# Patient Record
Sex: FEMALE | Race: WHITE | NOT HISPANIC OR LATINO | Employment: FULL TIME | URBAN - METROPOLITAN AREA
[De-identification: names, ages, dates, MRNs, and addresses within clinical notes are randomized per-mention and may not be internally consistent; named-entity substitution may affect disease eponyms.]

---

## 2021-07-12 ENCOUNTER — HISTORICAL (OUTPATIENT)
Dept: ADMINISTRATIVE | Facility: HOSPITAL | Age: 31
End: 2021-07-12

## 2021-07-12 LAB — SARS-COV-2 RNA RESP QL NAA+PROBE: NEGATIVE

## 2021-07-15 ENCOUNTER — HISTORICAL (OUTPATIENT)
Dept: ADMINISTRATIVE | Facility: HOSPITAL | Age: 31
End: 2021-07-15

## 2021-07-15 LAB
FLUAV AG UPPER RESP QL IA.RAPID: NEGATIVE
FLUBV AG UPPER RESP QL IA.RAPID: NEGATIVE
SARS-COV-2 RNA RESP QL NAA+PROBE: NOT DETECTED

## 2021-07-17 LAB — FINAL CULTURE: NORMAL

## 2021-07-18 ENCOUNTER — HISTORICAL (OUTPATIENT)
Dept: ADMINISTRATIVE | Facility: HOSPITAL | Age: 31
End: 2021-07-18

## 2021-07-18 LAB
ABS NEUT (OLG): 3.63 X10(3)/MCL (ref 2.1–9.2)
BASOPHILS NFR BLD MANUAL: 0 %
BILIRUB SERPL-MCNC: NEGATIVE MG/DL
BLOOD URINE, POC: NORMAL
CLARITY, POC UA: CLEAR
COLOR, POC UA: YELLOW
EOSINOPHIL NFR BLD MANUAL: 0 %
ERYTHROCYTE [DISTWIDTH] IN BLOOD BY AUTOMATED COUNT: 13.1 % (ref 11.5–14.5)
GLUCOSE UR QL STRIP: NEGATIVE
GRANULOCYTES NFR BLD MANUAL: 45 % (ref 43–75)
HCT VFR BLD AUTO: 39.1 % (ref 35–46)
HGB BLD-MCNC: 13.1 GM/DL (ref 12–16)
KETONES UR QL STRIP: NEGATIVE
LEUKOCYTE EST, POC UA: NEGATIVE
LYMPHOCYTES NFR BLD MANUAL: 43 % (ref 20.5–51.1)
LYMPHOCYTES NFR BLD MANUAL: 5 %
MCH RBC QN AUTO: 30.5 PG (ref 26–34)
MCHC RBC AUTO-ENTMCNC: 33.5 GM/DL (ref 31–37)
MCV RBC AUTO: 90.9 FL (ref 80–100)
MONOCYTES NFR BLD MANUAL: 7 % (ref 2–9)
NITRITE, POC UA: NEGATIVE
PH, POC UA: 7
PLATELET # BLD AUTO: 131 X10(3)/MCL (ref 130–400)
PLATELET # BLD EST: ADEQUATE 10*3/UL
PMV BLD AUTO: 10.8 FL (ref 7.4–10.4)
PROTEIN, POC: NEGATIVE
RBC # BLD AUTO: 4.3 X10(6)/MCL (ref 4–5.2)
RBC MORPH BLD: NORMAL
SPECIFIC GRAVITY, POC UA: 1.01
UROBILINOGEN, POC UA: NORMAL
WBC # SPEC AUTO: 8.8 X10(3)/MCL (ref 4.5–11)

## 2021-07-20 LAB — FINAL CULTURE: NO GROWTH

## 2021-11-06 ENCOUNTER — HISTORICAL (OUTPATIENT)
Dept: ADMINISTRATIVE | Facility: HOSPITAL | Age: 31
End: 2021-11-06

## 2021-11-06 LAB — SARS-COV-2 RNA RESP QL NAA+PROBE: NEGATIVE

## 2022-04-10 ENCOUNTER — HISTORICAL (OUTPATIENT)
Dept: ADMINISTRATIVE | Facility: HOSPITAL | Age: 32
End: 2022-04-10

## 2022-04-26 VITALS
DIASTOLIC BLOOD PRESSURE: 91 MMHG | OXYGEN SATURATION: 99 % | BODY MASS INDEX: 21.99 KG/M2 | WEIGHT: 124.13 LBS | HEIGHT: 63 IN | SYSTOLIC BLOOD PRESSURE: 132 MMHG

## 2022-05-03 NOTE — HISTORICAL [facility]
This is a historical note converted from [employer]. Formatting and pictures may have been removed.  Please reference [employer] for original formatting and attached multimedia. Chief Complaint  Seen multiple times in [facility] this week, all resp test POC/PCR negative, still c/o fever, also states frequent UTI.  History of Present Illness  Seen multiple times in [facility] this week, all resp test POC/PCR negative, still c/o fever, also states hx frequent UTI. currently taking azithromycin 5 day course, is on day 3. No sore throat. States one of the providers that saw her earlier this week heard wheezing in her lungs. She denies all of the following: headache, congestion, ear pain, throat pain, cough, abd pain, nausea, vomiting, diarrhea, left sided abd fullness/discomfort; dysuria and vaginal discharge.? States for the last week has been sleeping 18+ hours per day. PCP is an hour away.  Review of Systems  Constitutional:?negative except as stated in HPI  Eye:?negative except as stated in HPI  ENMT:?negative except as stated in HPI  Respiratory:?negative except as stated in HPI  Cardiovascular:?negative except as stated in HPI  Gastrointestinal:?negative except as stated in HPI  Genitourinary:?negative except as stated in HPI  Hema/Lymph:?negative except as stated in HPI  Endocrine:?negative except as stated in HPI  Musculoskeletal:?negative except as stated in HPI  Integumentary:?negative except as stated in HPI  Neurologic: negative except as stated in HPI  Physical Exam  Vitals & Measurements  T:?37.9? ?C (Oral)? HR:?95(Peripheral)? RR:?18? BP:?112/76? SpO2:?100%?  HT:?157.00?cm? WT:?56.400?kg? BMI:?22.88? LMP:?07/05/2021 00:00 CDT?  General:?appears wellno signs of respiratory distress?  Eye: PERRLA, EOMI,?clearconjunctiva  HENT:?TMs?clear, flat, light reflexes at 5/7 position, ear canals?normal, posterior pharynx?with?diffuse erythema, cobblestoning, +PND, oropharynx and nasal mucosal surfaces moist, no maxillary/frontal sinus  tenderness to palpation  Neck: full range of motion, no thyromegaly;?1 enlarged?posterior cervical?node on?the right?  Respiratory:?clear to auscultation bilaterally, a/p, no wheezes  Cardiovascular:?regular rate and rhythm without murmurs, gallops or rubs  Gastrointestinal:?soft, non-tender,?non-distended?with normal bowel sounds  Musculoskeletal:?BAL, ambulatory?without assistance or device  Integumentary: no rashes or skin lesions visible  Neurologic: cranial nerves intact, no signs of peripheral neurological deficit, motor/sensory function intact  Assessment/Plan  1.?Fever?R50.9  ?CXR negative; Urine dip negative; Monospot negative, EBV panel pending; COVID negative x2, RSV/FLU AB/Strep negative.  CBC pending.  Complete Azithro course.  If no explanation for fevers comes in labs, defer to PCP for further workup, pt understands.  Ordered:  CBC w/ Auto Diff, Routine collect, 07/18/21 12:08:00 CDT, Blood, Stop date 07/18/21 12:09:00 CDT, Nurse collect, Fever, 07/18/21 12:08:00 CDT  Maryuri-Barr Virus (EBV) Acute Infection Abs Profile-[facility] [id number], Stat collect, 07/18/21 12:08:00 CDT, Blood, Collected, Stop date 07/18/21 12:08:00 CDT, Nurse collect, Pharyngitis  Fever, 07/18/21 12:08:00 CDT  Urine Culture [id number], Routine collect, 07/18/21 13:10:00 CDT, Urine, Clean Catch, Nurse collect, Stop date 07/18/21 13:15:00 CDT, Fever  Frequent UTI  ?   Problem List/Past Medical History  Ongoing  ADD (attention deficit disorder)  Depressed  Historical  ADHD  Depression  Procedure/Surgical History  appendectomy (2001)   Medications  azithromycin 250 mg oral tablet, Oral  D-AMPHETAMINE SALT COMBO 20MG TAB, 20 mg= 1 tab(s), Oral, BID  Paxil 20 mg oral tablet, 20 mg= 1 tab(s), Oral, Daily  Zenchent 0.4 mg-35 mcg oral tablet  Allergies  No Known Allergies  Social History  Abuse/Neglect  No, No, Yes, 07/18/2021  No, 07/15/2021  Alcohol  Current, Beer, Daily, 05/03/2018  Employment/School  Employed,  07/12/2021  Home/Environment  Lives with Alone. Living situation: Home/Independent., 12/21/2018  Nutrition/Health  Regular, 12/21/2018  Substance Use - Denies Substance Abuse, 04/24/2013  Never, 05/03/2018  Tobacco - Denies Tobacco Use, 04/24/2013  Never (less than 100 in lifetime), N/A, 07/18/2021  Family History  Hypertension.: Father.  Stroke: Father.  Health Maintenance  Health Maintenance  ???Pending?(in the next year)  ??? ??OverDue  ??? ? ? ?Influenza Vaccine due??10/01/20??and every 1??day(s)  ??? ??Due?  ??? ? ? ?Cervical Cancer Screening due??07/18/21??Unknown Frequency  ??? ? ? ?Tetanus Vaccine due??07/18/21??and every 10??year(s)  ??? ??Due In Future?  ??? ? ? ?Obesity Screening not due until??01/01/22??and every 1??year(s)  ??? ? ? ?Alcohol Misuse Screening not due until??01/02/22??and every 1??year(s)  ??? ? ? ?ADL Screening not due until??01/27/22??and every 1??year(s)  ???Satisfied?(in the past 1 year)  ??? ??Satisfied?  ??? ? ? ?ADL Screening on??01/27/21.??Satisfied by Polly Prakash LPN  ??? ? ? ?Alcohol Misuse Screening on??07/12/21.??Satisfied by Jim Navarro  ??? ? ? ?Blood Pressure Screening on??07/18/21.??Satisfied by Jim Navarro  ??? ? ? ?Body Mass Index Check on??07/18/21.??Satisfied by Jim Navarro  ??? ? ? ?Depression Screening on??07/18/21.??Satisfied by Jim Navarro  ??? ? ? ?Influenza Vaccine on??01/27/21.??Satisfied by Polly Prakash LPN  ??? ? ? ?Obesity Screening on??07/18/21.??Satisfied by Jim Navarro  ?  Lab Results  Test Name Test Result Date/Time   Urine Color Urine Dipstick Yellow 07/18/2021 12:07 CDT   Urine Appearance Urine Dipstick Clear 07/18/2021 12:07 CDT   pH Urine Dipstick 7 07/18/2021 12:07 CDT   Specific Gravity Urine Dipstick 1.010 07/18/2021 12:07 CDT   Blood Urine Dipstick 1+ Small 07/18/2021 12:07 CDT   Glucose Urine Dipstick Negative 07/18/2021 12:07 CDT   Ketones Urine Dipstick Negative 07/18/2021 12:07 CDT   Protein Urine Dipstick Negative 07/18/2021 12:07  CDT   Bilirubin Urine Dipstick Negative 07/18/2021 12:07 CDT   Urobilinogen Urine Dipstick 0.2 mg/dl 07/18/2021 12:07 CDT   Leukocytes Urine Dipstick Negative 07/18/2021 12:07 CDT   Nitrite Urine Dipstick Negative 07/18/2021 12:07 CDT   Influ A PCR Negative UA 07/15/2021 15:26 CDT   Influ B PCR Negative UA 07/15/2021 15:26 CDT   Resp Sync PCR Negative UA 07/15/2021 15:26 CDT   SARS-CoV-2 PCR Cepheid 07/15/2021 15:26 CDT   Mononucleosis Test POC Negative 07/18/2021 12:07 CDT   Rapid Strep POC Negative 07/15/2021 16:06 CDT   Diagnostic Results  (07/18/2021 13:25 CDT XR Chest 2 Views)  Radiology Report  EXAMINATION  XR Chest 2 Views  ?  INDICATION  Fever x 1 week; wheezing on previous exam  ?  Comparison: 12/15/2011  ?  FINDINGS  The heart is normal in size. There is no focal airspace consolidation.  There is no pleural effusion or definite visible pneumothorax. There  is no acute bony abnormality identified.  ?  IMPRESSION  No acute abnormality of the chest.  ?  Signature Line  Electronically Signed By: Siobhan Islas MD  Date/Time Signed: 07/18/2021 13:26  ? [1]     [1]?XR Chest 2 Views; Siobhan Islas MD 07/18/2021 13:25 CDT

## 2022-12-25 ENCOUNTER — HOSPITAL ENCOUNTER (EMERGENCY)
Facility: HOSPITAL | Age: 32
Discharge: HOME | End: 2022-12-25
Attending: STUDENT IN AN ORGANIZED HEALTH CARE EDUCATION/TRAINING PROGRAM
Payer: MEDICAID

## 2022-12-25 VITALS
BODY MASS INDEX: 23.04 KG/M2 | OXYGEN SATURATION: 100 % | WEIGHT: 130 LBS | HEART RATE: 94 BPM | DIASTOLIC BLOOD PRESSURE: 85 MMHG | TEMPERATURE: 98 F | SYSTOLIC BLOOD PRESSURE: 121 MMHG | RESPIRATION RATE: 17 BRPM | HEIGHT: 63 IN

## 2022-12-25 DIAGNOSIS — S82.851A CLOSED DISPLACED TRIMALLEOLAR FRACTURE OF RIGHT ANKLE, INITIAL ENCOUNTER: Primary | ICD-10-CM

## 2022-12-25 LAB
B-HCG UR QL: NEGATIVE
CTP QC/QA: YES

## 2022-12-25 PROCEDURE — 99285 EMERGENCY DEPT VISIT HI MDM: CPT

## 2022-12-25 PROCEDURE — 63600175 PHARM REV CODE 636 W HCPCS: Performed by: STUDENT IN AN ORGANIZED HEALTH CARE EDUCATION/TRAINING PROGRAM

## 2022-12-25 PROCEDURE — 96361 HYDRATE IV INFUSION ADD-ON: CPT | Mod: 59

## 2022-12-25 PROCEDURE — 96375 TX/PRO/DX INJ NEW DRUG ADDON: CPT

## 2022-12-25 PROCEDURE — 25000003 PHARM REV CODE 250: Performed by: STUDENT IN AN ORGANIZED HEALTH CARE EDUCATION/TRAINING PROGRAM

## 2022-12-25 PROCEDURE — 96374 THER/PROPH/DIAG INJ IV PUSH: CPT

## 2022-12-25 PROCEDURE — 27818 TREATMENT OF ANKLE FRACTURE: CPT | Mod: RT

## 2022-12-25 PROCEDURE — 96376 TX/PRO/DX INJ SAME DRUG ADON: CPT

## 2022-12-25 PROCEDURE — 81025 URINE PREGNANCY TEST: CPT | Performed by: STUDENT IN AN ORGANIZED HEALTH CARE EDUCATION/TRAINING PROGRAM

## 2022-12-25 RX ORDER — KETAMINE HYDROCHLORIDE 50 MG/ML
2 INJECTION, SOLUTION INTRAMUSCULAR; INTRAVENOUS
Status: COMPLETED | OUTPATIENT
Start: 2022-12-25 | End: 2022-12-25

## 2022-12-25 RX ORDER — MORPHINE SULFATE 2 MG/ML
2 INJECTION, SOLUTION INTRAMUSCULAR; INTRAVENOUS
Status: DISCONTINUED | OUTPATIENT
Start: 2022-12-25 | End: 2022-12-25

## 2022-12-25 RX ORDER — MORPHINE SULFATE 2 MG/ML
4 INJECTION, SOLUTION INTRAMUSCULAR; INTRAVENOUS
Status: COMPLETED | OUTPATIENT
Start: 2022-12-25 | End: 2022-12-25

## 2022-12-25 RX ORDER — MORPHINE SULFATE 2 MG/ML
2 INJECTION, SOLUTION INTRAMUSCULAR; INTRAVENOUS
Status: COMPLETED | OUTPATIENT
Start: 2022-12-25 | End: 2022-12-25

## 2022-12-25 RX ORDER — HYDROCODONE BITARTRATE AND ACETAMINOPHEN 5; 325 MG/1; MG/1
1 TABLET ORAL EVERY 6 HOURS PRN
Qty: 14 TABLET | Refills: 0 | Status: SHIPPED | OUTPATIENT
Start: 2022-12-25 | End: 2022-12-30

## 2022-12-25 RX ADMIN — KETAMINE HYDROCHLORIDE 118 MG: 50 INJECTION INTRAMUSCULAR; INTRAVENOUS at 09:12

## 2022-12-25 RX ADMIN — SODIUM CHLORIDE 1000 ML: 9 INJECTION, SOLUTION INTRAVENOUS at 08:12

## 2022-12-25 RX ADMIN — MORPHINE SULFATE 2 MG: 2 INJECTION, SOLUTION INTRAMUSCULAR; INTRAVENOUS at 08:12

## 2022-12-25 RX ADMIN — MORPHINE SULFATE 4 MG: 2 INJECTION, SOLUTION INTRAMUSCULAR; INTRAVENOUS at 11:12

## 2022-12-25 NOTE — Clinical Note
"Rebekah Ohelías Bullard was seen and treated in our emergency department on 12/25/2022.  She may return to work on 12/28/2022.       If you have any questions or concerns, please don't hesitate to call.      Ezra Greene MD"

## 2022-12-25 NOTE — ED PROVIDER NOTES
Encounter Date: 12/25/2022       History     Chief Complaint   Patient presents with    Ankle Pain     PT REPORTS SLIP AND FALL THIS AM. RT ANKLE W GROSS DEFORMITY.  PPP. PT HYPOTENSIVE.  DR SAVAGE MADE AWARE OF PT CONDITION.      32-year-old female presents to ED for right ankle deformity.  States she slipped outside of her house and twisted her ankle.  States the foot has been angulated to the side since.  States otherwise healthy, no medical problems, just started her period so not pregnant. has no allergic reactions to any medications.  Reports intact sensation of the foot itself.  No open wounds.  No problems with the ankle previously.  No other complaints or concerns at this time.    Review of patient's allergies indicates:  No Known Allergies  History reviewed. No pertinent past medical history.  Past Surgical History:   Procedure Laterality Date    APPENDECTOMY  2001     No family history on file.  Social History     Tobacco Use    Smoking status: Never    Smokeless tobacco: Never   Substance Use Topics    Alcohol use: Yes     Comment: occassionally    Drug use: Not Currently     Types: Marijuana     Review of Systems   Constitutional:  Negative for chills, diaphoresis and fever.   HENT:  Negative for congestion, rhinorrhea, sinus pain and sore throat.    Eyes:  Negative for pain, discharge and itching.   Respiratory:  Negative for cough, chest tightness and shortness of breath.    Cardiovascular:  Negative for chest pain and palpitations.   Gastrointestinal:  Negative for abdominal pain, nausea and vomiting.   Genitourinary:  Negative for dysuria, flank pain and hematuria.   Musculoskeletal:  Negative for back pain and myalgias.        Right ankle deformity.   Skin:  Negative for color change and rash.   Neurological:  Negative for dizziness, weakness and headaches.   Psychiatric/Behavioral:  Negative for confusion. The patient is not hyperactive.      Physical Exam     Initial Vitals [12/25/22 0758]   BP  Pulse Resp Temp SpO2   (!) 77/46 78 16 97.9 °F (36.6 °C) 100 %      MAP       --         Physical Exam    Vitals reviewed.  Constitutional: She appears well-developed and well-nourished. She is not diaphoretic. She appears distressed.   HENT:   Head: Normocephalic and atraumatic.   Eyes: Conjunctivae and EOM are normal. Pupils are equal, round, and reactive to light.   Neck: Neck supple. No tracheal deviation present.   Normal range of motion.  Cardiovascular:  Normal rate, regular rhythm, normal heart sounds and intact distal pulses.           Pulmonary/Chest: Breath sounds normal. No respiratory distress.   Abdominal: Abdomen is soft. There is no abdominal tenderness. There is no rebound and no guarding.   Musculoskeletal:      Cervical back: Normal range of motion and neck supple.      Comments: Patient has normal movement of the right hip and knee.  Right ankle has a gross deformity noted with the foot angled 45° to the right.  Intact sensation in all dermatomes of the associated foot.  Foot warm and well perfused.  No open wounds or lacerations.  No other acute findings.     Neurological: She is alert and oriented to person, place, and time. She has normal strength. GCS score is 15. GCS eye subscore is 4. GCS verbal subscore is 5. GCS motor subscore is 6.   Skin: Skin is warm and dry. Capillary refill takes less than 2 seconds. No rash noted.   Psychiatric: She has a normal mood and affect. Her behavior is normal. Judgment and thought content normal.       ED Course   Procedural Sedation        Date/Time: 12/25/2022 5:42 PM  Performed by: Ezra Greene MD  Authorized by: Ezra Greene MD   ASA Class: Class 1 - Heathy patient. No medical history.  Mallampati Score: Class 1 - Visualization of the soft palate, fauces, uvula, and anterior/posterior pillars.     Equipment: on cardiac monitor., on BP monitor., on CO2 monitor., on supplemental oxygen., suction available. and airway equipment available.     Sedation  type: moderate (conscious) sedation    Sedatives: ketamine  Analgesia: morphine  Total Sedation Time (min): 30  Vitals: Vital signs were monitored during sedation.  Complications: No complications.       Critical Care    Date/Time: 12/25/2022 5:49 PM  Performed by: Ezra Greene MD  Authorized by: Ezra Greene MD   Total critical care time (exclusive of procedural time) : 45 minutes  Critical care time was exclusive of separately billable procedures and treating other patients.  Critical care was time spent personally by me on the following activities: evaluation of patient's response to treatment, obtaining history from patient or surrogate, ordering and review of laboratory studies, pulse oximetry, review of old charts, development of treatment plan with patient or surrogate, examination of patient, ordering and performing treatments and interventions, ordering and review of radiographic studies and re-evaluation of patient's condition.  Comments: Reduction, monitoring, complex fracture       Labs Reviewed   POCT URINE PREGNANCY          Imaging Results              X-Ray Ankle Complete Right (Final result)  Result time 12/25/22 10:13:01      Final result by Adelfo Hernandez MD (12/25/22 10:13:01)                   Impression:      As above.      Electronically signed by: Adelfo Hernandez  Date:    12/25/2022  Time:    10:13               Narrative:    EXAMINATION:  XR ANKLE COMPLETE 3 VIEW RIGHT    CLINICAL HISTORY:  Pain, unspecified    TECHNIQUE:  Three views    COMPARISON:  Same date.    FINDINGS:  Post reduction radiographs show much improved displaced fractures of the distal fibula and tibia.  The alignment is anatomic.  There is also no asymmetry or dislocation of the ankle mortise on these images.                                       X-Ray Ankle Complete Right (Final result)  Result time 12/25/22 09:05:39      Final result by Declan Tomas MD (12/25/22 09:05:39)                   Impression:       Fracture dislocation as above      Electronically signed by: Declan Tomas  Date:    12/25/2022  Time:    09:05               Narrative:    EXAMINATION:  XR ANKLE COMPLETE 3 VIEW RIGHT    CLINICAL HISTORY:  Pain, unspecified    COMPARISON:  None.    FINDINGS:  There is a comminuted fracture of the distal fibula with fracture of the medial and likely posterior malleolus with dislocation at the level of the ankle no other definite fractures identified    Joint spaces preserved.    No blastic or lytic lesions.    Soft tissues within normal limits.                                       Medications   ketamine injection 118 mg (118 mg Intravenous Given 12/25/22 0940)   sodium chloride 0.9% bolus 1,000 mL 1,000 mL (0 mLs Intravenous Stopped 12/25/22 1000)   morphine injection 2 mg (2 mg Intravenous Given 12/25/22 0844)   morphine injection 4 mg (4 mg Intravenous Given 12/25/22 1138)     Medical Decision Making:   History:   I obtained history from: EMS provider.  Clinical Tests:   Lab Tests: Reviewed and Ordered  Radiological Study: Reviewed and Ordered  ED Management:  33 yo F presents after fracturing her R ankle after stepping wrong just prior to arrival. In distress. Foot prior to reduction was warm, well perfused with normal sensation. No involvement of the proximal joints or other limbs. Xray showed trimalleolar fx. Significant other bedside. Extensive conversation had with regards to sedation, reduction and expected outcomes, case and ortho f/u. Pt voiced understanding. Ketamine used for sedation, joint reduced successfully and cast with stirrups and posterior slab. Foot after remained warm, well perfused with intact movement. Cap refill <2 seconds. Repeat xray demonstrated significant improvement in alignment.  Waited for sedation to wear off and pain well controlled in department. Referred urgently to ortho, prescribed pain meds and provided strict return precautions. Voiced understanding and d/c stable.  (Jc) extensive time was spent in managing this patients care in isolation.                         Clinical Impression:   Final diagnoses:  [S87.198J] Closed displaced trimalleolar fracture of right ankle, initial encounter (Primary)        ED Disposition Condition    Discharge Stable          ED Prescriptions       Medication Sig Dispense Start Date End Date Auth. Provider    HYDROcodone-acetaminophen (NORCO) 5-325 mg per tablet () Take 1 tablet by mouth every 6 (six) hours as needed for Pain. 14 tablet 2022 Tristan Parks Jr., FNP          Follow-up Information       Follow up With Specialties Details Why Contact Info    [facility] Emergency Medicine  As needed, If symptoms worsen [address] [facility] [address] [phone]    Orthopedics  Schedule an appointment as soon as possible for a visit in 1 week               Ezra Greene MD  [phone]

## 2023-01-04 ENCOUNTER — HOSPITAL ENCOUNTER (OUTPATIENT)
Dept: RADIOLOGY | Facility: HOSPITAL | Age: 33
Discharge: HOME | End: 2023-01-04
Attending: FAMILY MEDICINE
Payer: MEDICAID

## 2023-01-04 ENCOUNTER — HOSPITAL ENCOUNTER (OUTPATIENT)
Dept: RADIOLOGY | Facility: HOSPITAL | Age: 33
Discharge: HOME | End: 2023-01-04
Attending: ORTHOPAEDIC SURGERY
Payer: MEDICAID

## 2023-01-04 ENCOUNTER — ANESTHESIA EVENT (OUTPATIENT)
Dept: SURGERY | Facility: HOSPITAL | Age: 33
End: 2023-01-04
Payer: MEDICAID

## 2023-01-04 ENCOUNTER — OFFICE VISIT (OUTPATIENT)
Dept: ORTHOPEDICS | Facility: CLINIC | Age: 33
End: 2023-01-04
Payer: MEDICAID

## 2023-01-04 VITALS — SYSTOLIC BLOOD PRESSURE: 130 MMHG | DIASTOLIC BLOOD PRESSURE: 93 MMHG | TEMPERATURE: 99 F | HEART RATE: 142 BPM

## 2023-01-04 DIAGNOSIS — S82.851A CLOSED DISPLACED TRIMALLEOLAR FRACTURE OF RIGHT ANKLE, INITIAL ENCOUNTER: ICD-10-CM

## 2023-01-04 DIAGNOSIS — G89.29 CHRONIC PAIN OF RIGHT ANKLE: Primary | ICD-10-CM

## 2023-01-04 DIAGNOSIS — G89.29 CHRONIC PAIN OF RIGHT ANKLE: ICD-10-CM

## 2023-01-04 DIAGNOSIS — M25.571 CHRONIC PAIN OF RIGHT ANKLE: Primary | ICD-10-CM

## 2023-01-04 DIAGNOSIS — M25.571 CHRONIC PAIN OF RIGHT ANKLE: ICD-10-CM

## 2023-01-04 PROCEDURE — 73610 X-RAY EXAM OF ANKLE: CPT | Mod: TC,RT

## 2023-01-04 PROCEDURE — 3075F SYST BP GE 130 - 139MM HG: CPT | Mod: CPTII,,, | Performed by: SPECIALIST

## 2023-01-04 PROCEDURE — 99204 PR OFFICE/OUTPT VISIT, NEW, LEVL IV, 45-59 MIN: ICD-10-PCS | Mod: S$PBB,,, | Performed by: SPECIALIST

## 2023-01-04 PROCEDURE — 3075F PR MOST RECENT SYSTOLIC BLOOD PRESS GE 130-139MM HG: ICD-10-PCS | Mod: CPTII,,, | Performed by: SPECIALIST

## 2023-01-04 PROCEDURE — 99215 OFFICE O/P EST HI 40 MIN: CPT | Mod: PBBFAC

## 2023-01-04 PROCEDURE — 99204 OFFICE O/P NEW MOD 45 MIN: CPT | Mod: S$PBB,,, | Performed by: SPECIALIST

## 2023-01-04 PROCEDURE — 3080F PR MOST RECENT DIASTOLIC BLOOD PRESSURE >= 90 MM HG: ICD-10-PCS | Mod: CPTII,,, | Performed by: SPECIALIST

## 2023-01-04 PROCEDURE — 3080F DIAST BP >= 90 MM HG: CPT | Mod: CPTII,,, | Performed by: SPECIALIST

## 2023-01-04 RX ORDER — NORETHINDRONE AND ETHINYL ESTRADIOL 0.4-0.035
1 KIT ORAL
COMMUNITY
Start: 2022-12-23

## 2023-01-04 RX ORDER — MUPIROCIN 20 MG/G
OINTMENT TOPICAL
Status: CANCELLED | OUTPATIENT
Start: 2023-01-04

## 2023-01-04 RX ORDER — PAROXETINE HYDROCHLORIDE 20 MG/1
20 TABLET, FILM COATED ORAL
COMMUNITY
Start: 2022-12-01 | End: 2023-01-23

## 2023-01-04 RX ORDER — FLUTICASONE PROPIONATE 50 MCG
2 SPRAY, SUSPENSION (ML) NASAL
COMMUNITY
Start: 2022-11-29 | End: 2023-01-23

## 2023-01-04 RX ORDER — DEXTROAMPHETAMINE SACCHARATE, AMPHETAMINE ASPARTATE, DEXTROAMPHETAMINE SULFATE AND AMPHETAMINE SULFATE 5; 5; 5; 5 MG/1; MG/1; MG/1; MG/1
1 TABLET ORAL 2 TIMES DAILY
COMMUNITY
Start: 2022-12-05

## 2023-01-04 NOTE — ANESTHESIA PREPROCEDURE EVALUATION
"                                                                                                             01/04/2023  Rebekah Bullard is a 32 y.o., female with PMHx of depression presents for ORIF Rt ankle.    COVID STATUS: NOT VACCINATED  BETA-BLOCKER: NONE    PAT NURSE PHONE INTERVIEW 1/9/23    PROBLEM LIST:  -  RIGHT TRIMALLEOLAR ANKLE FRACTURE 2/2 SLIP & FALL 12/25/22  -  UPT STATUS  -  DEPRESSION  -  ADHD - on ADDERALL  -  Dx 1/6/23 w/STREP - on AMOXIL X 10 DAYS; 1/9/23 PATIENT REPORTS AFEBRILE & SORE THROAT RESOLVED -THB  -  ETOH "OCASSIONALLY"  -  MARIJUANA USE    AM Rx DOS: FLONASE, PAXIL    ORDERS -   SURGEON: 1/4/23 CBC, CMP, A1c, TSH;  ANESTHESIA: UPT    Pre-op Assessment    I have reviewed the NPO Status.      Review of Systems  Anesthesia Hx:  No problems with previous Anesthesia    Social:  Non-Smoker    Cardiovascular:  Cardiovascular Normal     Pulmonary:  Pulmonary Normal    Renal/:  Renal/ Normal     Hepatic/GI:  Hepatic/GI Normal    Neurological:  Neurology Normal    Endocrine:  Endocrine Normal    Psych:   depression        Vitals:    01/10/23 0704 01/10/23 0713 01/10/23 0957   BP:  110/75 120/72   Pulse:  84 88   Resp:   20   Temp:  37.2 °C (98.9 °F) 36 °C (96.8 °F)   TempSrc:  Oral Temporal   SpO2:  (!) 94% 99%   Weight: 62 kg (136 lb 11 oz)           Physical Exam  General: Alert, Cooperative and Well nourished    Airway:  Mallampati: II   Mouth Opening: Normal  TM Distance: Normal  Tongue: Normal  Neck ROM: Normal ROM    Dental:  Intact    Chest/Lungs:  Normal Respiratory Rate, Clear to auscultation    Heart:  Rate: Normal  Rhythm: Regular Rhythm  Sounds: Normal       Latest Reference Range & Units 01/10/23 07:15   Preg Test, Ur Negative  Negative     Lab Results   Component Value Date    WBC 11.0 01/04/2023    HGB 13.4 01/04/2023    HCT 40.7 01/04/2023    MCV 93.3 01/04/2023     01/04/2023       CMP  Sodium Level   Date Value Ref Range Status   01/04/2023 137 136 - 145 mmol/L " Final     Potassium Level   Date Value Ref Range Status   01/04/2023 4.0 3.5 - 5.1 mmol/L Final     Carbon Dioxide   Date Value Ref Range Status   01/04/2023 25 22 - 29 mmol/L Final     Blood Urea Nitrogen   Date Value Ref Range Status   01/04/2023 14.5 7.0 - 18.7 mg/dL Final     Creatinine   Date Value Ref Range Status   01/04/2023 0.76 0.55 - 1.02 mg/dL Final     Calcium Level Total   Date Value Ref Range Status   01/04/2023 10.0 8.4 - 10.2 mg/dL Final     Albumin Level   Date Value Ref Range Status   01/04/2023 4.0 3.5 - 5.0 g/dL Final     Bilirubin Total   Date Value Ref Range Status   01/04/2023 0.3 <=1.5 mg/dL Final     Alkaline Phosphatase   Date Value Ref Range Status   01/04/2023 82 40 - 150 unit/L Final     Aspartate Aminotransferase   Date Value Ref Range Status   01/04/2023 21 5 - 34 unit/L Final     Alanine Aminotransferase   Date Value Ref Range Status   01/04/2023 17 0 - 55 unit/L Final     eGFR   Date Value Ref Range Status   01/04/2023 >90 mls/min/1.73/m2 Final         Anesthesia Plan  Type of Anesthesia, risks & benefits discussed:    Anesthesia Type: Gen Supraglottic Airway, Regional  Intra-op Monitoring Plan: Standard ASA Monitors  Post Op Pain Control Plan: IV/PO Opioids PRN  Induction:  IV  Airway Plan: Direct  Informed Consent: Informed consent signed with the Patient and all parties understand the risks and agree with anesthesia plan.  All questions answered.   ASA Score: 1  Day of Surgery Review of History & Physical: H&P Update referred to the surgeon/provider.    Ready For Surgery From Anesthesia Perspective.     .

## 2023-01-04 NOTE — PROGRESS NOTES
Orthopedic surgery history and physical    Orthopaedic Injuries:  Right ankle trimalleolar fracture dislocation      S:   This is a pleasant 32-year-old female who sustained a closed right ankle fracture dislocation on Jean Day 12/25/2022 when she had a low energy twisting fall at home.  She went to the emergency department, where she was reduced under sedation and placed into a splint.  Since then, she has been compliant with nonweightbearing on crutches, she has been elevating her foot, she has had persistent ankle pain that has gotten somewhat better since her date of injury she is now week and half out.  She says she is otherwise healthy, she has not have any diabetes, nonsmoker, in good shape.  She works at a local restaurant as a .  She denies any fever, chills, chest pain, shortness of breath, is here to discuss surgery    O:   Vitals:    01/04/23 0928   BP: (!) 130/93   Pulse: (!) 142   Temp: 99.4 °F (37.4 °C)     No results for input(s): WBC, HGB, HCT, PLT in the last 72 hours.  No results for input(s): NA, K, CL, CO2, HCO3C, BUN, LABCREA, GLU in the last 72 hours.  No results for input(s): ESR, CRP in the last 72 hours.    PE:  Gen: A+Ox3, NAD  Card: RRR by RP  Lungs: nonlabored breathing, symmetric chest rise  Abd: S/NT/ND  Right lower extremity   Splint removed   Some resolving ecchymosis over the medial heel pad   Some erythema over her anterior tibiotalar joint line  Tenderness to palpation over the medial and lateral ankle   No fracture blisters   Skin wrinkles to the touch   Sensation intact to light touch over the dorsum and plantar aspect of the foot   Foot is warm and well perfused   EHL/FHL 5/5    Radiology:  Independent review of radiographs shows a trimalleolar right ankle fracture with a well reduced ankle mortise.    A/P:   32-year-old female status post right trimalleolar ankle fracture on 12/25/2022     -we long discussion regarding the indication for open reduction internal  fixation of her right ankle.  We discussed the risk of posttraumatic arthritis, need for further surgery, chronic pain, stiffness, symptomatic hardware, risk of infection and soft tissue breakdown, loss of limb and life, neurovascular injury and consented her for the procedure  -we will get her booked for surgery next Tuesday 12/10/2022   -will get a CT scan urgently for preoperative planning of her right ankle   -preoperative blood work   -placed back into a splint today, strict nonweightbearing right lower extremity, elevate elbow swelling    Dr. Rasheed Turk  [facility]opa[facility]  Pager: [phone]

## 2023-01-04 NOTE — PROGRESS NOTES
Faculty Attestation: Rebekah Bullard  was seen at [facility] in the Orthopaedic Clinic. Discussed with the resident at the time of the visit. History of Present Illness, Physical Exam, and Assessment and Plan reviewed. Treatment plan is reasonable and appropriate. Compliance with treatment recommendations is important. Discussed with the resident at the time of the visit.  No procedure was performed.     Edgar Chairez MD MD  Orthopaedic Surgery

## 2023-01-06 ENCOUNTER — HOSPITAL ENCOUNTER (OUTPATIENT)
Dept: RADIOLOGY | Facility: HOSPITAL | Age: 33
Discharge: HOME | End: 2023-01-06
Attending: ORTHOPAEDIC SURGERY
Payer: MEDICAID

## 2023-01-06 ENCOUNTER — OFFICE VISIT (OUTPATIENT)
Dept: URGENT CARE | Facility: CLINIC | Age: 33
End: 2023-01-06
Attending: ORTHOPAEDIC SURGERY
Payer: MEDICAID

## 2023-01-06 VITALS
WEIGHT: 136.63 LBS | DIASTOLIC BLOOD PRESSURE: 75 MMHG | BODY MASS INDEX: 24.21 KG/M2 | HEART RATE: 103 BPM | HEIGHT: 63 IN | RESPIRATION RATE: 18 BRPM | OXYGEN SATURATION: 98 % | SYSTOLIC BLOOD PRESSURE: 117 MMHG | TEMPERATURE: 98 F

## 2023-01-06 DIAGNOSIS — J02.9 ACUTE PHARYNGITIS, UNSPECIFIED ETIOLOGY: Primary | ICD-10-CM

## 2023-01-06 DIAGNOSIS — J02.9 SORE THROAT: ICD-10-CM

## 2023-01-06 DIAGNOSIS — R68.89 FLU-LIKE SYMPTOMS: ICD-10-CM

## 2023-01-06 DIAGNOSIS — Z11.52 ENCOUNTER FOR SCREENING FOR SEVERE ACUTE RESPIRATORY SYNDROME CORONAVIRUS 2 (SARS-COV-2) INFECTION: ICD-10-CM

## 2023-01-06 DIAGNOSIS — S82.851A CLOSED DISPLACED TRIMALLEOLAR FRACTURE OF RIGHT ANKLE, INITIAL ENCOUNTER: ICD-10-CM

## 2023-01-06 LAB
CTP QC/QA: YES
CTP QC/QA: YES
FLUAV AG NPH QL: NEGATIVE
FLUBV AG NPH QL: NEGATIVE
SARS-COV-2 RDRP RESP QL NAA+PROBE: NEGATIVE

## 2023-01-06 PROCEDURE — 99214 OFFICE O/P EST MOD 30 MIN: CPT | Mod: S$PBB,,, | Performed by: NURSE PRACTITIONER

## 2023-01-06 PROCEDURE — 87081 CULTURE SCREEN ONLY: CPT | Performed by: NURSE PRACTITIONER

## 2023-01-06 PROCEDURE — 99214 PR OFFICE/OUTPT VISIT, EST, LEVL IV, 30-39 MIN: ICD-10-PCS | Mod: S$PBB,,, | Performed by: NURSE PRACTITIONER

## 2023-01-06 PROCEDURE — 99214 OFFICE O/P EST MOD 30 MIN: CPT | Mod: PBBFAC,25 | Performed by: NURSE PRACTITIONER

## 2023-01-06 PROCEDURE — 87635 SARS-COV-2 COVID-19 AMP PRB: CPT | Mod: PBBFAC | Performed by: NURSE PRACTITIONER

## 2023-01-06 PROCEDURE — 73700 CT LOWER EXTREMITY W/O DYE: CPT | Mod: TC,RT

## 2023-01-06 PROCEDURE — 87804 INFLUENZA ASSAY W/OPTIC: CPT | Mod: PBBFAC | Performed by: NURSE PRACTITIONER

## 2023-01-06 RX ORDER — AMOXICILLIN 875 MG/1
875 TABLET, FILM COATED ORAL 2 TIMES DAILY
Qty: 20 TABLET | Refills: 0 | Status: SHIPPED | OUTPATIENT
Start: 2023-01-06 | End: 2023-01-16

## 2023-01-06 NOTE — PROGRESS NOTES
"Subjective:       Patient ID: Rebekah Bullard is a 32 y.o. female.    Vitals:  height is 5' 3" (1.6 m) and weight is 62 kg (136 lb 9.6 oz). Her oral temperature is 98.4 °F (36.9 °C). Her blood pressure is 117/75 and her pulse is 103. Her respiration is 18 and oxygen saturation is 98%.     Chief Complaint: Sore Throat (xYesterday) and Fever (xYesterday)    CC as above. Taking tylenol for symptoms      Constitution: Positive for fever.   HENT:  Positive for sore throat.    Cardiovascular: Negative.    Respiratory: Negative.       Objective:      Physical Exam   Constitutional: She is oriented to person, place, and time. She appears well-developed.   HENT:   Head: Normocephalic.   Ears:   Right Ear: Tympanic membrane normal.   Left Ear: Tympanic membrane normal.   Nose: Nose normal.   Mouth/Throat: Posterior oropharyngeal erythema present.   Eyes: Conjunctivae and EOM are normal. Pupils are equal, round, and reactive to light.   Neck: Neck supple.   Cardiovascular: Normal rate, regular rhythm and normal heart sounds.   Pulmonary/Chest: Effort normal and breath sounds normal.   Musculoskeletal: Normal range of motion.         General: Normal range of motion.   Neurological: She is alert and oriented to person, place, and time.   Skin: Skin is warm and dry.   Psychiatric: Her behavior is normal.   Vitals reviewed.      Assessment:       1. Acute pharyngitis, unspecified etiology    2. Encounter for screening for severe acute respiratory syndrome coronavirus 2 (SARS-CoV-2) infection    3. Flu-like symptoms    4. Sore throat              Office Visit on 01/06/2023   Component Date Value Ref Range Status    POC Rapid COVID 01/06/2023 Negative  Negative Final     Acceptable 01/06/2023 Yes   Final    Rapid Influenza A Ag 01/06/2023 Negative  Negative Final    Rapid Influenza B Ag 01/06/2023 Negative  Negative Final     Acceptable 01/06/2023 Yes   Final        Plan:           Strep PCR pending, " will treat based on clinical symptoms.  Start medication, change toothbrush after 3 days of antibiotic.  Saltwater gargles.  May use acetaminophen alternate with ibuprofen as directed for comfort.  ER precautions.    Acute pharyngitis, unspecified etiology  -     amoxicillin (AMOXIL) 875 MG tablet; Take 1 tablet (875 mg total) by mouth 2 (two) times daily. for 10 days  Dispense: 20 tablet; Refill: 0    Encounter for screening for severe acute respiratory syndrome coronavirus 2 (SARS-CoV-2) infection  -     POCT COVID-19 Rapid Screening    Flu-like symptoms  -     POCT Influenza A/B    Sore throat  -     Strep Only Culture

## 2023-01-07 LAB — BACTERIA THROAT CULT: ABNORMAL

## 2023-01-09 ENCOUNTER — TELEPHONE (OUTPATIENT)
Dept: URGENT CARE | Facility: CLINIC | Age: 33
End: 2023-01-09
Payer: MEDICAID

## 2023-01-09 RX ORDER — CETIRIZINE HYDROCHLORIDE 10 MG/1
10 TABLET ORAL DAILY
COMMUNITY

## 2023-01-09 RX ORDER — OXYCODONE AND ACETAMINOPHEN 5; 325 MG/1; MG/1
1 TABLET ORAL EVERY 6 HOURS PRN
Qty: 28 TABLET | Refills: 0 | Status: SHIPPED | OUTPATIENT
Start: 2023-01-09 | End: 2023-01-16

## 2023-01-09 RX ORDER — IBUPROFEN 800 MG/1
800 TABLET ORAL 3 TIMES DAILY
Qty: 42 TABLET | Refills: 0 | Status: SHIPPED | OUTPATIENT
Start: 2023-01-09 | End: 2023-01-10 | Stop reason: HOSPADM

## 2023-01-09 RX ORDER — ONDANSETRON 4 MG/1
4 TABLET, FILM COATED ORAL 2 TIMES DAILY
Qty: 28 TABLET | Refills: 0 | Status: SHIPPED | OUTPATIENT
Start: 2023-01-09 | End: 2023-01-23

## 2023-01-10 ENCOUNTER — ANESTHESIA (OUTPATIENT)
Dept: SURGERY | Facility: HOSPITAL | Age: 33
End: 2023-01-10
Payer: MEDICAID

## 2023-01-10 ENCOUNTER — HOSPITAL ENCOUNTER (OUTPATIENT)
Facility: HOSPITAL | Age: 33
Discharge: HOME | End: 2023-01-10
Attending: ORTHOPAEDIC SURGERY | Admitting: ORTHOPAEDIC SURGERY
Payer: MEDICAID

## 2023-01-10 VITALS
TEMPERATURE: 98 F | HEART RATE: 81 BPM | WEIGHT: 136.69 LBS | DIASTOLIC BLOOD PRESSURE: 78 MMHG | RESPIRATION RATE: 20 BRPM | OXYGEN SATURATION: 99 % | BODY MASS INDEX: 24.21 KG/M2 | SYSTOLIC BLOOD PRESSURE: 128 MMHG

## 2023-01-10 DIAGNOSIS — S82.851A CLOSED DISPLACED TRIMALLEOLAR FRACTURE OF RIGHT ANKLE, INITIAL ENCOUNTER: ICD-10-CM

## 2023-01-10 DIAGNOSIS — M25.571 CHRONIC PAIN OF RIGHT ANKLE: ICD-10-CM

## 2023-01-10 DIAGNOSIS — G89.29 CHRONIC PAIN OF RIGHT ANKLE: ICD-10-CM

## 2023-01-10 LAB
B-HCG UR QL: NEGATIVE
CTP QC/QA: YES

## 2023-01-10 PROCEDURE — 27201423 OPTIME MED/SURG SUP & DEVICES STERILE SUPPLY: Performed by: ORTHOPAEDIC SURGERY

## 2023-01-10 PROCEDURE — C1769 GUIDE WIRE: HCPCS | Performed by: ORTHOPAEDIC SURGERY

## 2023-01-10 PROCEDURE — 27823 PR OPEN TX TRIMALLEOLAR ANKLE FX W FIX PST LIP: ICD-10-PCS | Mod: RT,,, | Performed by: ORTHOPAEDIC SURGERY

## 2023-01-10 PROCEDURE — 37000008 HC ANESTHESIA 1ST 15 MINUTES: Performed by: ORTHOPAEDIC SURGERY

## 2023-01-10 PROCEDURE — 37000009 HC ANESTHESIA EA ADD 15 MINS: Performed by: ORTHOPAEDIC SURGERY

## 2023-01-10 PROCEDURE — 71000033 HC RECOVERY, INTIAL HOUR: Performed by: ORTHOPAEDIC SURGERY

## 2023-01-10 PROCEDURE — 63600175 PHARM REV CODE 636 W HCPCS

## 2023-01-10 PROCEDURE — 01480 ANES OPEN PX LOWER L/A/F NOS: CPT | Performed by: ORTHOPAEDIC SURGERY

## 2023-01-10 PROCEDURE — 36000709 HC OR TIME LEV III EA ADD 15 MIN: Performed by: ORTHOPAEDIC SURGERY

## 2023-01-10 PROCEDURE — 71000016 HC POSTOP RECOV ADDL HR: Performed by: ORTHOPAEDIC SURGERY

## 2023-01-10 PROCEDURE — 27823 TREATMENT OF ANKLE FRACTURE: CPT | Mod: RT,,, | Performed by: ORTHOPAEDIC SURGERY

## 2023-01-10 PROCEDURE — 63600175 PHARM REV CODE 636 W HCPCS: Performed by: NURSE ANESTHETIST, CERTIFIED REGISTERED

## 2023-01-10 PROCEDURE — C1713 ANCHOR/SCREW BN/BN,TIS/BN: HCPCS | Performed by: ORTHOPAEDIC SURGERY

## 2023-01-10 PROCEDURE — 81025 URINE PREGNANCY TEST: CPT | Performed by: NURSE PRACTITIONER

## 2023-01-10 PROCEDURE — 25000003 PHARM REV CODE 250: Performed by: NURSE ANESTHETIST, CERTIFIED REGISTERED

## 2023-01-10 PROCEDURE — 63600175 PHARM REV CODE 636 W HCPCS: Performed by: ANESTHESIOLOGY

## 2023-01-10 PROCEDURE — 76942 ECHO GUIDE FOR BIOPSY: CPT | Performed by: ANESTHESIOLOGY

## 2023-01-10 PROCEDURE — 71000015 HC POSTOP RECOV 1ST HR: Performed by: ORTHOPAEDIC SURGERY

## 2023-01-10 PROCEDURE — 36000708 HC OR TIME LEV III 1ST 15 MIN: Performed by: ORTHOPAEDIC SURGERY

## 2023-01-10 DEVICE — SCREW VARIAX NON-LOK 2.7X30MM: Type: IMPLANTABLE DEVICE | Site: ANKLE | Status: FUNCTIONAL

## 2023-01-10 DEVICE — SCREW VARIAX NON-LOK 2.7X24MM: Type: IMPLANTABLE DEVICE | Site: ANKLE | Status: FUNCTIONAL

## 2023-01-10 DEVICE — SCREW BONE THREADED T8: Type: IMPLANTABLE DEVICE | Site: ANKLE | Status: FUNCTIONAL

## 2023-01-10 DEVICE — IMPLANTABLE DEVICE: Type: IMPLANTABLE DEVICE | Site: ANKLE | Status: FUNCTIONAL

## 2023-01-10 DEVICE — SCREW BONE NON LOCK 3.5X12MM: Type: IMPLANTABLE DEVICE | Site: ANKLE | Status: FUNCTIONAL

## 2023-01-10 DEVICE — SCREW VARIAX NON LOK 2.4X14MM: Type: IMPLANTABLE DEVICE | Site: ANKLE | Status: FUNCTIONAL

## 2023-01-10 DEVICE — SCREW BONE LOCK T10 3.5X14MM: Type: IMPLANTABLE DEVICE | Site: ANKLE | Status: FUNCTIONAL

## 2023-01-10 DEVICE — SCREW VARIAX NON LOK 2.4X16MM: Type: IMPLANTABLE DEVICE | Site: ANKLE | Status: FUNCTIONAL

## 2023-01-10 DEVICE — SCREW BONE LOCK T8 2.7X22MM: Type: IMPLANTABLE DEVICE | Site: ANKLE | Status: FUNCTIONAL

## 2023-01-10 RX ORDER — ONDANSETRON 2 MG/ML
INJECTION INTRAMUSCULAR; INTRAVENOUS
Status: DISCONTINUED | OUTPATIENT
Start: 2023-01-10 | End: 2023-01-10

## 2023-01-10 RX ORDER — PROPOFOL 10 MG/ML
VIAL (ML) INTRAVENOUS
Status: DISCONTINUED | OUTPATIENT
Start: 2023-01-10 | End: 2023-01-10

## 2023-01-10 RX ORDER — HYDROMORPHONE HYDROCHLORIDE 1 MG/ML
INJECTION, SOLUTION INTRAMUSCULAR; INTRAVENOUS; SUBCUTANEOUS
Status: COMPLETED
Start: 2023-01-10 | End: 2023-01-10

## 2023-01-10 RX ORDER — MORPHINE SULFATE 10 MG/ML
INJECTION INTRAMUSCULAR; INTRAVENOUS; SUBCUTANEOUS
Status: DISCONTINUED
Start: 2023-01-10 | End: 2023-01-10 | Stop reason: HOSPADM

## 2023-01-10 RX ORDER — KETOROLAC TROMETHAMINE 10 MG/1
10 TABLET, FILM COATED ORAL EVERY 8 HOURS
Qty: 15 TABLET | Refills: 0 | Status: SHIPPED | OUTPATIENT
Start: 2023-01-10 | End: 2023-01-15

## 2023-01-10 RX ORDER — KETOROLAC TROMETHAMINE 30 MG/ML
INJECTION, SOLUTION INTRAMUSCULAR; INTRAVENOUS
Status: DISCONTINUED | OUTPATIENT
Start: 2023-01-10 | End: 2023-01-10

## 2023-01-10 RX ORDER — PROMETHAZINE HYDROCHLORIDE 25 MG/ML
INJECTION, SOLUTION INTRAMUSCULAR; INTRAVENOUS
Status: COMPLETED
Start: 2023-01-10 | End: 2023-01-10

## 2023-01-10 RX ORDER — MIDAZOLAM HYDROCHLORIDE 1 MG/ML
INJECTION INTRAMUSCULAR; INTRAVENOUS
Status: DISCONTINUED
Start: 2023-01-10 | End: 2023-01-10 | Stop reason: HOSPADM

## 2023-01-10 RX ORDER — CEFAZOLIN SODIUM 1 G/3ML
INJECTION, POWDER, FOR SOLUTION INTRAMUSCULAR; INTRAVENOUS
Status: DISCONTINUED | OUTPATIENT
Start: 2023-01-10 | End: 2023-01-10

## 2023-01-10 RX ORDER — LIDOCAINE HYDROCHLORIDE 20 MG/ML
INJECTION INTRAVENOUS
Status: DISCONTINUED | OUTPATIENT
Start: 2023-01-10 | End: 2023-01-10

## 2023-01-10 RX ORDER — EPINEPHRINE 1 MG/ML
INJECTION, SOLUTION, CONCENTRATE INTRAVENOUS
Status: DISCONTINUED
Start: 2023-01-10 | End: 2023-01-10 | Stop reason: HOSPADM

## 2023-01-10 RX ORDER — NEOSTIGMINE METHYLSULFATE 1 MG/ML
INJECTION, SOLUTION INTRAVENOUS
Status: DISCONTINUED | OUTPATIENT
Start: 2023-01-10 | End: 2023-01-10

## 2023-01-10 RX ORDER — ONDANSETRON 2 MG/ML
4 INJECTION INTRAMUSCULAR; INTRAVENOUS DAILY PRN
Status: DISCONTINUED | OUTPATIENT
Start: 2023-01-10 | End: 2023-01-10 | Stop reason: HOSPADM

## 2023-01-10 RX ORDER — OXYCODONE HYDROCHLORIDE 5 MG/1
5 TABLET ORAL
Status: DISCONTINUED | OUTPATIENT
Start: 2023-01-10 | End: 2023-01-10 | Stop reason: HOSPADM

## 2023-01-10 RX ORDER — LIDOCAINE HYDROCHLORIDE 10 MG/ML
1 INJECTION, SOLUTION EPIDURAL; INFILTRATION; INTRACAUDAL; PERINEURAL ONCE
Status: ACTIVE | OUTPATIENT
Start: 2023-01-10

## 2023-01-10 RX ORDER — ASPIRIN 81 MG/1
81 TABLET ORAL 2 TIMES DAILY
Qty: 60 TABLET | Refills: 0 | Status: SHIPPED | OUTPATIENT
Start: 2023-01-10 | End: 2023-02-09

## 2023-01-10 RX ORDER — HYDROMORPHONE HYDROCHLORIDE 1 MG/ML
INJECTION, SOLUTION INTRAMUSCULAR; INTRAVENOUS; SUBCUTANEOUS
Status: DISCONTINUED
Start: 2023-01-10 | End: 2023-01-10 | Stop reason: HOSPADM

## 2023-01-10 RX ORDER — MIDAZOLAM HYDROCHLORIDE 1 MG/ML
5 INJECTION INTRAMUSCULAR; INTRAVENOUS ONCE AS NEEDED
Status: COMPLETED | OUTPATIENT
Start: 2023-01-10 | End: 2023-01-10

## 2023-01-10 RX ORDER — MUPIROCIN 20 MG/G
OINTMENT TOPICAL
Status: DISCONTINUED | OUTPATIENT
Start: 2023-01-10 | End: 2023-01-10 | Stop reason: HOSPADM

## 2023-01-10 RX ORDER — SODIUM CHLORIDE 0.9 % (FLUSH) 0.9 %
10 SYRINGE (ML) INJECTION
Status: DISCONTINUED | OUTPATIENT
Start: 2023-01-10 | End: 2023-01-10 | Stop reason: HOSPADM

## 2023-01-10 RX ORDER — MEPERIDINE HYDROCHLORIDE 25 MG/ML
INJECTION INTRAMUSCULAR; INTRAVENOUS; SUBCUTANEOUS
Status: COMPLETED
Start: 2023-01-10 | End: 2023-01-10

## 2023-01-10 RX ORDER — PROMETHAZINE HYDROCHLORIDE 25 MG/ML
12.5 INJECTION, SOLUTION INTRAMUSCULAR; INTRAVENOUS ONCE
Status: COMPLETED | OUTPATIENT
Start: 2023-01-10 | End: 2023-01-10

## 2023-01-10 RX ORDER — SODIUM CHLORIDE, SODIUM LACTATE, POTASSIUM CHLORIDE, CALCIUM CHLORIDE 600; 310; 30; 20 MG/100ML; MG/100ML; MG/100ML; MG/100ML
INJECTION, SOLUTION INTRAVENOUS CONTINUOUS
Status: ACTIVE | OUTPATIENT
Start: 2023-01-10

## 2023-01-10 RX ORDER — MORPHINE SULFATE 2 MG/ML
2 INJECTION, SOLUTION INTRAMUSCULAR; INTRAVENOUS EVERY 5 MIN PRN
Status: DISCONTINUED | OUTPATIENT
Start: 2023-01-10 | End: 2023-01-10 | Stop reason: HOSPADM

## 2023-01-10 RX ORDER — FENTANYL CITRATE 50 UG/ML
INJECTION, SOLUTION INTRAMUSCULAR; INTRAVENOUS
Status: DISCONTINUED | OUTPATIENT
Start: 2023-01-10 | End: 2023-01-10

## 2023-01-10 RX ORDER — HYDROMORPHONE HYDROCHLORIDE 1 MG/ML
0.5 INJECTION, SOLUTION INTRAMUSCULAR; INTRAVENOUS; SUBCUTANEOUS EVERY 5 MIN PRN
Status: COMPLETED | OUTPATIENT
Start: 2023-01-10 | End: 2023-01-10

## 2023-01-10 RX ORDER — MIDAZOLAM HYDROCHLORIDE 5 MG/ML
INJECTION INTRAMUSCULAR; INTRAVENOUS
Status: DISCONTINUED
Start: 2023-01-10 | End: 2023-01-10 | Stop reason: WASHOUT

## 2023-01-10 RX ORDER — DEXAMETHASONE SODIUM PHOSPHATE 4 MG/ML
INJECTION, SOLUTION INTRA-ARTICULAR; INTRALESIONAL; INTRAMUSCULAR; INTRAVENOUS; SOFT TISSUE
Status: DISCONTINUED | OUTPATIENT
Start: 2023-01-10 | End: 2023-01-10

## 2023-01-10 RX ORDER — ROPIVACAINE HYDROCHLORIDE 5 MG/ML
INJECTION, SOLUTION EPIDURAL; INFILTRATION; PERINEURAL
Status: COMPLETED | OUTPATIENT
Start: 2023-01-10 | End: 2023-01-10

## 2023-01-10 RX ORDER — MEPERIDINE HYDROCHLORIDE 25 MG/ML
12.5 INJECTION INTRAMUSCULAR; INTRAVENOUS; SUBCUTANEOUS EVERY 10 MIN PRN
Status: DISCONTINUED | OUTPATIENT
Start: 2023-01-10 | End: 2023-01-10 | Stop reason: HOSPADM

## 2023-01-10 RX ORDER — ROCURONIUM BROMIDE 10 MG/ML
INJECTION, SOLUTION INTRAVENOUS
Status: DISCONTINUED | OUTPATIENT
Start: 2023-01-10 | End: 2023-01-10

## 2023-01-10 RX ORDER — GLYCOPYRROLATE 0.2 MG/ML
INJECTION INTRAMUSCULAR; INTRAVENOUS
Status: DISCONTINUED | OUTPATIENT
Start: 2023-01-10 | End: 2023-01-10

## 2023-01-10 RX ORDER — ROPIVACAINE HYDROCHLORIDE 5 MG/ML
INJECTION, SOLUTION EPIDURAL; INFILTRATION; PERINEURAL
Status: COMPLETED
Start: 2023-01-10 | End: 2023-01-10

## 2023-01-10 RX ORDER — METHOCARBAMOL 500 MG/1
500 TABLET, FILM COATED ORAL 4 TIMES DAILY
Qty: 40 TABLET | Refills: 0 | Status: SHIPPED | OUTPATIENT
Start: 2023-01-10 | End: 2023-01-20

## 2023-01-10 RX ADMIN — DEXAMETHASONE SODIUM PHOSPHATE 8 MG: 4 INJECTION, SOLUTION INTRA-ARTICULAR; INTRALESIONAL; INTRAMUSCULAR; INTRAVENOUS; SOFT TISSUE at 11:01

## 2023-01-10 RX ADMIN — ROPIVACAINE HYDROCHLORIDE 30 ML: 5 INJECTION, SOLUTION EPIDURAL; INFILTRATION; PERINEURAL at 10:01

## 2023-01-10 RX ADMIN — FENTANYL CITRATE 50 MCG: 50 INJECTION, SOLUTION INTRAMUSCULAR; INTRAVENOUS at 10:01

## 2023-01-10 RX ADMIN — LIDOCAINE HYDROCHLORIDE 50 MG: 20 INJECTION INTRAVENOUS at 10:01

## 2023-01-10 RX ADMIN — NEOSTIGMINE METHYLSULFATE 3 MG: 1 INJECTION INTRAVENOUS at 12:01

## 2023-01-10 RX ADMIN — KETOROLAC TROMETHAMINE 30 MG: 30 INJECTION, SOLUTION INTRAMUSCULAR; INTRAVENOUS at 02:01

## 2023-01-10 RX ADMIN — MEPERIDINE HYDROCHLORIDE 12.5 MG: 25 INJECTION INTRAMUSCULAR; INTRAVENOUS; SUBCUTANEOUS at 03:01

## 2023-01-10 RX ADMIN — HYDROMORPHONE HYDROCHLORIDE 0.5 MG: 1 INJECTION, SOLUTION INTRAMUSCULAR; INTRAVENOUS; SUBCUTANEOUS at 03:01

## 2023-01-10 RX ADMIN — SODIUM CHLORIDE, POTASSIUM CHLORIDE, SODIUM LACTATE AND CALCIUM CHLORIDE: 600; 310; 30; 20 INJECTION, SOLUTION INTRAVENOUS at 09:01

## 2023-01-10 RX ADMIN — MORPHINE SULFATE 2 MG: 2 INJECTION, SOLUTION INTRAMUSCULAR; INTRAVENOUS at 03:01

## 2023-01-10 RX ADMIN — ONDANSETRON 4 MG: 2 INJECTION INTRAMUSCULAR; INTRAVENOUS at 11:01

## 2023-01-10 RX ADMIN — PROMETHAZINE HYDROCHLORIDE 12.5 MG: 25 INJECTION INTRAMUSCULAR; INTRAVENOUS at 03:01

## 2023-01-10 RX ADMIN — MIDAZOLAM HYDROCHLORIDE 5 MG: 1 INJECTION, SOLUTION INTRAMUSCULAR; INTRAVENOUS at 10:01

## 2023-01-10 RX ADMIN — GLYCOPYRROLATE 0.4 MG: 0.2 INJECTION INTRAMUSCULAR; INTRAVENOUS at 12:01

## 2023-01-10 RX ADMIN — FENTANYL CITRATE 50 MCG: 50 INJECTION, SOLUTION INTRAMUSCULAR; INTRAVENOUS at 12:01

## 2023-01-10 RX ADMIN — CEFAZOLIN 2 G: 330 INJECTION, POWDER, FOR SOLUTION INTRAMUSCULAR; INTRAVENOUS at 10:01

## 2023-01-10 RX ADMIN — PROMETHAZINE HYDROCHLORIDE 12.5 MG: 25 INJECTION, SOLUTION INTRAMUSCULAR; INTRAVENOUS at 03:01

## 2023-01-10 RX ADMIN — PROPOFOL 150 MG: 10 INJECTION, EMULSION INTRAVENOUS at 10:01

## 2023-01-10 RX ADMIN — ROCURONIUM BROMIDE 50 MG: 10 INJECTION, SOLUTION INTRAVENOUS at 10:01

## 2023-01-10 RX ADMIN — SODIUM CHLORIDE, POTASSIUM CHLORIDE, SODIUM LACTATE AND CALCIUM CHLORIDE: 600; 310; 30; 20 INJECTION, SOLUTION INTRAVENOUS at 02:01

## 2023-01-10 NOTE — TELEPHONE ENCOUNTER
----- Message from CESARIO Alexander sent at 1/7/2023  4:41 PM CST -----  Call with results. She is currently on amoxicillin, cont taking.

## 2023-01-10 NOTE — ANESTHESIA PROCEDURE NOTES
Intubation    Date/Time: 1/10/2023 10:42 AM  Performed by: Brandie Jimenez CRNA  Authorized by: Hannah Quach MD     Intubation:     Induction:  Intravenous    Intubated:  Postinduction    Mask Ventilation:  Easy mask    Attempts:  1    Attempted By:  CRNA    Method of Intubation:  Direct    Blade:  Xavier 2    Laryngeal View Grade: Grade I - full view of cords      Difficult Airway Encountered?: No      Complications:  None    Style/Cuff Inflation:  Cuffed (inflated to minimal occlusive pressure)    Inflation Amount (mL):  6    Tube secured:  20    Secured at:  The lips    Placement Verified By:  Capnometry    Complicating Factors:  None    Findings Post-Intubation:  Atraumatic/condition of teeth unchanged

## 2023-01-10 NOTE — DISCHARGE INSTRUCTIONS
Keep splint on and clean/dry until follow up appointment in clinic.   Patient is to be NON weight-bearing for at least 6-8 weeks.

## 2023-01-10 NOTE — ANESTHESIA PROCEDURE NOTES
Peripheral Block    Patient location during procedure: pre-op   Block not for primary anesthetic.  Reason for block: at surgeon's request and post-op pain management   Post-op Pain Location: Rt ankle pain   Start time: 1/10/2023 10:08 AM  Timeout: 1/10/2023 10:08 AM   End time: 1/10/2023 10:13 AM    Staffing  Authorizing Provider: Hannah Quach MD  Performing Provider: Hannah Quach MD    Preanesthetic Checklist  Completed: patient identified, IV checked, site marked, risks and benefits discussed, surgical consent, monitors and equipment checked, pre-op evaluation and timeout performed  Peripheral Block  Patient position: supine  Prep: ChloraPrep  Patient monitoring: heart rate, cardiac monitor, continuous pulse ox, continuous capnometry and frequent blood pressure checks  Block type: popliteal  Laterality: right  Injection technique: single shot  Needle  Needle type: Stimuplex   Needle gauge: 21 G  Needle length: 4 in  Needle localization: anatomical landmarks and ultrasound guidance   -ultrasound image captured on disc.  Assessment  Injection assessment: negative aspiration, negative parasthesia and local visualized surrounding nerve  Paresthesia pain: none  Heart rate change: no  Slow fractionated injection: yes  Pain Tolerance: comfortable throughout block and no complaints  Medications:    Medications: ropivacaine (NAROPIN) injection 0.5% - Perineural   30 mL - 1/10/2023 10:09:00 AM    Additional Notes  VSS.  [facility] RN monitoring vitals throughout procedure.  Patient tolerated procedure well.

## 2023-01-10 NOTE — H&P
Orthopedic Surgery Interval H&P    Patients history and exam have been reviewed. There are no changes from previous H&P documented below. Plan for OR today with Dr. Lopez for ORIF R Ankle.  Discharge home following surgery    Arturo Patton MD  [facility]          Orthopedic surgery history and physical    Orthopaedic Injuries:  Right ankle trimalleolar fracture dislocation      S:   This is a pleasant 32-year-old female who sustained a closed right ankle fracture dislocation on [place] Day 12/25/2022 when she had a low energy twisting fall at home.  She went to the emergency department, where she was reduced under sedation and placed into a splint.  Since then, she has been compliant with nonweightbearing on crutches, she has been elevating her foot, she has had persistent ankle pain that has gotten somewhat better since her date of injury she is now week and half out.  She says she is otherwise healthy, she has not have any diabetes, nonsmoker, in good shape.  She works at a local restaurant as a .  She denies any fever, chills, chest pain, shortness of breath, is here to discuss surgery      History reviewed. No pertinent past medical history.  Past Surgical History:   Procedure Laterality Date    APPENDECTOMY  2001     Review of patient's allergies indicates:  No Known Allergies      O:   Vitals:    01/10/23 0713   BP: 110/75   Pulse: 84   Temp: 98.9 °F (37.2 °C)     No results for input(s): WBC, HGB, HCT, PLT in the last 72 hours.  No results for input(s): NA, K, CL, CO2, HCO3C, BUN, LABCREA, GLU in the last 72 hours.  No results for input(s): ESR, CRP in the last 72 hours.    PE:  Gen: A+Ox3, NAD  Card: RRR by RP  Lungs: nonlabored breathing, symmetric chest rise  Abd: S/NT/ND  Right lower extremity   Splint removed   Some resolving ecchymosis over the medial heel pad   Some erythema over her anterior tibiotalar joint line  Tenderness to palpation over the medial and lateral ankle   No  fracture blisters   Skin wrinkles to the touch   Sensation intact to light touch over the dorsum and plantar aspect of the foot   Foot is warm and well perfused   EHL/FHL 5/5    Radiology:  Independent review of radiographs shows a trimalleolar right ankle fracture with a well reduced ankle mortise.    A/P:   32-year-old female status post right trimalleolar ankle fracture on 12/25/2022     -we long discussion regarding the indication for open reduction internal fixation of her right ankle.  We discussed the risk of posttraumatic arthritis, need for further surgery, chronic pain, stiffness, symptomatic hardware, risk of infection and soft tissue breakdown, loss of limb and life, neurovascular injury and consented her for the procedure  -we will get her booked for surgery next Tuesday 12/10/2022   -will get a CT scan urgently for preoperative planning of her right ankle   -preoperative blood work   -placed back into a splint today, strict nonweightbearing right lower extremity, elevate elbow swelling    Dr. Rasheed Turk  \[facility]\"" Orthopaedic Surgery  Pager: [phone]

## 2023-01-10 NOTE — DISCHARGE SUMMARY
Orthopedic Surgery Discharge    Procedure: Right ankle ORIF    Pre-op diagnosis: Right trimalleolar ankle fracture    Postop diagnosis: same    Surgeon: DO Arturo Main MD, MD    Blood loss: <50cc    Fluids: see anesthesia documentation    Anesthesia: mac+regional    Complications: none      Patient presented to [facility] on day of scheduled surgery and underwent R ankle ORIF, please see operative report for further detail. Patient did well postoperatively and was found to be fit for discharge on POD# 0.  Their pain was controlled.  The patient met all discharge criteria.  The patient was discharged home in stable condition. Patient was given paper prescriptions.  They were given a follow-up appointment in 2 weeks in clinic for a wound check and range of motion check.  All questions and concerns of the patient were answered to their satisfaction.    Condition: Patient tolerated procedure well, was extubated, and returned to the recovery unit in stable condition.     Post Op Instructions    -maintain dressing clean and dry until followup  -nonweightbearing surgical extremity  -multimodal pain control  -return to clinic in 2 weeks for wound recheck and suture removal    Please call our team with questions or concerns    Dr. Rasheed Turk  [facility] Orthopaedic Surgery  Pager: [phone]

## 2023-01-10 NOTE — ANESTHESIA POSTPROCEDURE EVALUATION
Anesthesia Post Evaluation    Patient: Rebekah Bullard    Procedure(s) Performed: Procedure(s) (LRB):  ORIF, FRACTURE, ANKLE, TRIMALLEOLAR (Right)    Final Anesthesia Type: general      Patient location during evaluation: PACU  Post-procedure vital signs: reviewed and stable  Airway patency: patent      Anesthetic complications: no      Cardiovascular status: hemodynamically stable  Respiratory status: spontaneous ventilation  Follow-up not needed.          Vitals Value Taken Time   /85 01/10/23 1529   Temp 36.4 °C (97.6 °F) 01/10/23 1529   Pulse 79 01/10/23 1529   Resp 16 01/10/23 1535   SpO2 100 % 01/10/23 1529         No case tracking events are documented in the log.      Pain/Abimael Score: Pain Rating Prior to Med Admin: 10 (1/10/2023  3:35 PM)

## 2023-01-10 NOTE — TRANSFER OF CARE
Anesthesia Transfer of Care Note    Patient: Rebekah Bullard    Procedure(s) Performed: Procedure(s) (LRB):  ORIF, FRACTURE, ANKLE, TRIMALLEOLAR (Right)    Patient location: PACU    Anesthesia Type: general    Transport from OR: Transported from OR on room air with adequate spontaneous ventilation    Post pain: adequate analgesia    Post assessment: no apparent anesthetic complications    Post vital signs: stable    Level of consciousness: awake    Nausea/Vomiting: no nausea/vomiting    Complications: none    Transfer of care protocol was followed      Last vitals:   Visit Vitals  /72   Pulse 88   Temp 36 °C (96.8 °F) (Temporal)   Resp 20   Wt 62 kg (136 lb 11 oz)   LMP 12/20/2022   SpO2 99%   Breastfeeding No   BMI 24.21 kg/m²

## 2023-01-10 NOTE — BRIEF OP NOTE
Orthopedic Surgery Brief Op Note/DC Sum     Procedure: Right ankle ORIF     Pre-op diagnosis: Right trimalleolar ankle fracture     Postop diagnosis: same     Surgeon: DO Arturo Main MD, MD     Blood loss: <50cc     Fluids: see anesthesia documentation     Anesthesia: mac+regional     Complications: none        Patient presented to [facility] on day of scheduled surgery and underwent R ankle ORIF, please see operative report for further detail. Patient did well postoperatively and was found to be fit for discharge on POD# 0.  Their pain was controlled.  The patient met all discharge criteria.  The patient was discharged home in stable condition. Patient was given paper prescriptions.  They were given a follow-up appointment in 2 weeks in clinic for a wound check and range of motion check.  All questions and concerns of the patient were answered to their satisfaction.     Condition: Patient tolerated procedure well, was extubated, and returned to the recovery unit in stable condition.      Post Op Instructions     -maintain dressing clean and dry until followup  -nonweightbearing surgical extremity  -multimodal pain control  -return to clinic in 2 weeks for wound recheck and suture removal     Please call our team with questions or concerns     Dr. Rasheed Turk  [facility] Orthopaedic Surgery  Pager: [phone]

## 2023-01-10 NOTE — ANESTHESIA PROCEDURE NOTES
Intubation    Date/Time: 1/10/2023 10:42 AM  Performed by: Brandie Jimenez CRNA  Authorized by: Hannah Quach MD     Intubation:     Induction:  Intravenous    Intubated:  Postinduction    Mask Ventilation:  Easy mask    Attempts:  1    Attempted By:  CRNA    Method of Intubation:  Direct    Blade:  Xavier 2    Laryngeal View Grade: Grade I - full view of cords      Difficult Airway Encountered?: No      Complications:  None    Airway Device:  Oral endotracheal tube    Airway Device Size:  7.0    Style/Cuff Inflation:  Cuffed (inflated to minimal occlusive pressure)    Inflation Amount (mL):  6    Tube secured:  20    Secured at:  The lips    Placement Verified By:  Capnometry    Complicating Factors:  None    Findings Post-Intubation:  BS equal bilateral and atraumatic/condition of teeth unchanged

## 2023-01-23 ENCOUNTER — HOSPITAL ENCOUNTER (OUTPATIENT)
Dept: RADIOLOGY | Facility: HOSPITAL | Age: 33
Discharge: HOME | End: 2023-01-23
Attending: ORTHOPAEDIC SURGERY
Payer: MEDICAID

## 2023-01-23 ENCOUNTER — OFFICE VISIT (OUTPATIENT)
Dept: ORTHOPEDICS | Facility: CLINIC | Age: 33
End: 2023-01-23
Payer: MEDICAID

## 2023-01-23 VITALS
SYSTOLIC BLOOD PRESSURE: 107 MMHG | DIASTOLIC BLOOD PRESSURE: 72 MMHG | WEIGHT: 138 LBS | BODY MASS INDEX: 24.45 KG/M2 | HEIGHT: 63 IN | OXYGEN SATURATION: 97 % | HEART RATE: 87 BPM | TEMPERATURE: 98 F | RESPIRATION RATE: 19 BRPM

## 2023-01-23 DIAGNOSIS — M25.571 CHRONIC PAIN OF RIGHT ANKLE: ICD-10-CM

## 2023-01-23 DIAGNOSIS — G89.29 CHRONIC PAIN OF RIGHT ANKLE: ICD-10-CM

## 2023-01-23 DIAGNOSIS — S82.851A CLOSED DISPLACED TRIMALLEOLAR FRACTURE OF RIGHT ANKLE, INITIAL ENCOUNTER: Primary | ICD-10-CM

## 2023-01-23 DIAGNOSIS — S82.851A CLOSED DISPLACED TRIMALLEOLAR FRACTURE OF RIGHT ANKLE, INITIAL ENCOUNTER: ICD-10-CM

## 2023-01-23 PROCEDURE — 1159F MED LIST DOCD IN RCRD: CPT | Mod: CPTII,,, | Performed by: ORTHOPAEDIC SURGERY

## 2023-01-23 PROCEDURE — 99214 OFFICE O/P EST MOD 30 MIN: CPT | Mod: PBBFAC

## 2023-01-23 PROCEDURE — 99024 PR POST-OP FOLLOW-UP VISIT: ICD-10-PCS | Mod: ,,, | Performed by: ORTHOPAEDIC SURGERY

## 2023-01-23 PROCEDURE — 3078F PR MOST RECENT DIASTOLIC BLOOD PRESSURE < 80 MM HG: ICD-10-PCS | Mod: CPTII,,, | Performed by: ORTHOPAEDIC SURGERY

## 2023-01-23 PROCEDURE — 3078F DIAST BP <80 MM HG: CPT | Mod: CPTII,,, | Performed by: ORTHOPAEDIC SURGERY

## 2023-01-23 PROCEDURE — 3008F BODY MASS INDEX DOCD: CPT | Mod: CPTII,,, | Performed by: ORTHOPAEDIC SURGERY

## 2023-01-23 PROCEDURE — 1159F PR MEDICATION LIST DOCUMENTED IN MEDICAL RECORD: ICD-10-PCS | Mod: CPTII,,, | Performed by: ORTHOPAEDIC SURGERY

## 2023-01-23 PROCEDURE — 73610 X-RAY EXAM OF ANKLE: CPT | Mod: TC,RT

## 2023-01-23 PROCEDURE — 3074F PR MOST RECENT SYSTOLIC BLOOD PRESSURE < 130 MM HG: ICD-10-PCS | Mod: CPTII,,, | Performed by: ORTHOPAEDIC SURGERY

## 2023-01-23 PROCEDURE — 99024 POSTOP FOLLOW-UP VISIT: CPT | Mod: ,,, | Performed by: ORTHOPAEDIC SURGERY

## 2023-01-23 PROCEDURE — 3008F PR BODY MASS INDEX (BMI) DOCUMENTED: ICD-10-PCS | Mod: CPTII,,, | Performed by: ORTHOPAEDIC SURGERY

## 2023-01-23 PROCEDURE — 3074F SYST BP LT 130 MM HG: CPT | Mod: CPTII,,, | Performed by: ORTHOPAEDIC SURGERY

## 2023-01-23 RX ORDER — DEXTROAMPHETAMINE SACCHARATE, AMPHETAMINE ASPARTATE, DEXTROAMPHETAMINE SULFATE AND AMPHETAMINE SULFATE 5; 5; 5; 5 MG/1; MG/1; MG/1; MG/1
TABLET ORAL
COMMUNITY

## 2023-01-23 RX ORDER — PAROXETINE HYDROCHLORIDE 20 MG/1
TABLET, FILM COATED ORAL
COMMUNITY

## 2023-01-23 NOTE — OP NOTE
Orthopedic surgery Operative Note     Date of Service: 1/10/23     Pre-Operative Diagnosis:  Trimalleolar fracture right ankle    Post-Operative Diagnosis: Same     Procedure(s):   1. Open reduction internal fixation right trimalleolar ankle fracture     Anesthesia: General     Surgeon: Cali Lopez MD, was present and scrubbed for the key portions of the procedure.     Assistant(s):   MD Babatunde Fernandez, DO    Indications   Patient is a 33 y.o.female with a closed right trimalleolar ankle fracture. The patient was checked again in the Holding Room. The risks, benefits, complications, treatment options, and expected outcomes were reviewed again with the patient. The patient has elected to proceed with ORIF right ankle. The risks and potential complications include but are not limited to infection, nerve injury, vascular injury, persistent pain, potential skin necrosis, deep vein thrombosis, possible pulmonary embolus, complications of the anesthetics and failure of the implants with potential need for future surgery to remove or revise. The patient concurred with the proposed plan, giving informed consent. The site of surgery was identified by the patient and properly noted/marked by me.     Implant:   1. Jose Carlos mini frag posterior malleolus plate   2. Jose Carlos mini frag medial malleolar plate  3. Ada small frag lateral malleolar plate     Procedure Details:   The patient was taken to Operating Room. A Time-Out was held and the patient, location and procedure was verified and agreed upon by all members of the operating room staff. Prophylacic antibiotics were given.The patient was given general anesthesia.   Following the successful induction of anesthesia the patient was placed prone. The right lower extremity was prepped and draped in normal sterile manner.  A tourniquet was placed and inflated to 250 mmHg.  A posterolateral approach was made taking care to protect the neurovascular  structures.  Peroneal tendons were retracted and moved out of the way, 1st exposing the posterior tibia.  A plate was placed over the posterior malleolar fracture which was held reduced and the plate was secured in an antiglide fashion with C-arm guidance to ensure appropriate screw trajectory.  Next, peroneal tendons were retracted medially and the lateral malleolar fracture was addressed.  Lag screws were placed by technique in order to secure the fracture.  The plate was selected and placed over the lateral malleolus which was reduced in anatomic fashion and then screws were sequentially drilled, again under C-arm guidance guidance to confirm length and trajectory.  The wound was then closed in layers using Monocryl deep followed by nylon for skin closure.  The wound was covered with Ioban and drapes were taken down, the patient was repositioned supine.  The leg was then re-prepped and draped in sterile fashion.  Lastly a direct medial approach was made to the medial malleolus fracture.  Fracture was reduced and a buttress plate was selected and screws were drilled from a medial to lateral fashion through the plate with 1 screw distal at the tip of the medial malleolus for compression.  Tourniquet was then let down, and hemostasis was confirmed.  All screw trajectories and hardware was confirmed to be in appropriate position no hardware penetrating intra-articularly.  The ankle was then stressed under fluoroscopy and the syndesmosis determined to be stable.  The incision overlying the medial malleolus was closed in a similar fashion to the posterior lateral incision.    Sterile dressings were then applied over both incisions, and a well-padded short-leg splint was applied.  The patient was then awakened from anesthesia and transferred to recovery room.    Findings:  Closed, displaced right trimalleolar ankle fracture   Estimated blood loss:  Less than 100 cc   Drains:  None   Total IV fluids: Per anesthesia  records   Specimens:  None   Complications: None, pt tolerated the procedure well   Disposition: Awakened from anesthesia, and taken to the recovery room in a stable condition, having suffered no apparent untoward event   Condition: Stable     Post-Operative Management   Discharge when stable per PACU standpoint   Nonweightbearing right ankle for at least 8 weeks  Remain in splint until follow-up, we will remove splint at follow up, remove sutures and begin gentle ankle range of motion when nonweightbearing, transition to Cam boot  Multimodal pain control  Elevate extremity to minimize swelling   Return to clinic in 2 weeks for re-evaluation      Arturo Patton MD  [facility] Orthopedic Surgery

## 2023-01-23 NOTE — PROGRESS NOTES
[facility] Orthopaedic Surgery H&P Note    In brief, Rebekah Bullard is a 33 y.o. female status post left ankle trimalleolar fracture ORIF on 01/10/2023    HPI:  Patient now 2 weeks out from surgery, here for her 1st postoperative visit.  She is been compliant with nonweightbearing.  She says she has been elevating her leg, she says her pain is improving.  Has some numbness over her great toe, otherwise doing very well.    PMH: History reviewed. No pertinent past medical history.    PSH:   Past Surgical History:   Procedure Laterality Date    APPENDECTOMY  2001    ORIF, FRACTURE, ANKLE, TRIMALLEOLAR Right 1/10/2023    Procedure: ORIF, FRACTURE, ANKLE, TRIMALLEOLAR;  Surgeon: Cali Lopez Jr., MD;  Location: [facility];  Service: Orthopedics;  Laterality: Right;  c arm   kim  bone foam  c armour  tourniquet       SH:   Social History     Socioeconomic History    Marital status: Single   Tobacco Use    Smoking status: Never    Smokeless tobacco: Never   Substance and Sexual Activity    Alcohol use: Yes     Comment: occassionally    Drug use: Not Currently     Types: Marijuana     Comment: occ    Sexual activity: Yes       FH:   Family History   Problem Relation Age of Onset    Hypertension Father        Allergies: Review of patient's allergies indicates:  No Known Allergies    ROS:  Constitutional- no fever, fatigue, weakness  Eye- no vision loss, eye redness, drainage, or pain  ENMT- no sore throat, ear pain, sinus pain/congestion, nasal congestion/drainage  Respiratory- no cough, wheezing, or shortness of breath  CV- no chest pain, no palpitations, no edema  GI- no N/V/D; no abdominal pain    Physical Exam:  Vitals:    01/23/23 1046   BP: 107/72   Pulse: 87   Resp: 19   Temp: 97.9 °F (36.6 °C)       General: NAD  Cardio: RRR by peripheral pulse  Pulm: Normal WOB on room air, symmetric chest rise  Abd: Soft, NT/ND    MSK:    Left lower extremity   Posterolateral and medial incisions healing well, no erythema drainage or  fluctuance, sutures removed today   Ankle dorsiflexion to 30° short of neutral   Sensation intact to light touch over the dorsum and plantar aspect of her foot   Some diffuse numbness over her dorsal great toe   Palpable dorsalis pedis pulse   EHL/FHL 5/5    Imaging:   Independent review of radiographs today show appropriate placement of trimalleolar fixation, no interval displacement.    Assessment:  33-year-old female status post left trimalleolar ankle fracture ORIF on 01/10/2023     -patient doing well at her 1st postoperative visit   -x-rays today, sutures removed   -transitioned to a Cam boot, encourage ankle range of motion   -strict nonweightbearing for 8 weeks postoperatively   -follow up in 1 month repeat left ankle x-rays        Dr. Rasheed Turk  [facility] Orthopaedic Surgery

## 2023-01-23 NOTE — PROGRESS NOTES
Faculty Attestation: Rebekah Bullard  was seen at [facility] in the Orthopaedic Clinic. Discussed with the resident at the time of the visit. History of Present Illness, Physical Exam, and Assessment and Plan reviewed. Treatment plan is reasonable and appropriate. Compliance with treatment recommendations is important. No procedure was performed.     Gabriel Crisostomo MD  Orthopaedic Surgery

## 2023-02-27 ENCOUNTER — OFFICE VISIT (OUTPATIENT)
Dept: ORTHOPEDICS | Facility: CLINIC | Age: 33
End: 2023-02-27
Payer: MEDICAID

## 2023-02-27 ENCOUNTER — HOSPITAL ENCOUNTER (OUTPATIENT)
Dept: RADIOLOGY | Facility: HOSPITAL | Age: 33
Discharge: HOME | End: 2023-02-27
Attending: ORTHOPAEDIC SURGERY
Payer: MEDICAID

## 2023-02-27 VITALS — HEIGHT: 63 IN | BODY MASS INDEX: 24.45 KG/M2 | WEIGHT: 138 LBS

## 2023-02-27 DIAGNOSIS — S82.851A CLOSED DISPLACED TRIMALLEOLAR FRACTURE OF RIGHT ANKLE, INITIAL ENCOUNTER: Primary | ICD-10-CM

## 2023-02-27 DIAGNOSIS — S82.851A CLOSED DISPLACED TRIMALLEOLAR FRACTURE OF RIGHT ANKLE, INITIAL ENCOUNTER: ICD-10-CM

## 2023-02-27 PROCEDURE — 3008F BODY MASS INDEX DOCD: CPT | Mod: CPTII,,, | Performed by: SPECIALIST

## 2023-02-27 PROCEDURE — 3008F PR BODY MASS INDEX (BMI) DOCUMENTED: ICD-10-PCS | Mod: CPTII,,, | Performed by: SPECIALIST

## 2023-02-27 PROCEDURE — 99213 OFFICE O/P EST LOW 20 MIN: CPT | Mod: PBBFAC

## 2023-02-27 PROCEDURE — 1159F PR MEDICATION LIST DOCUMENTED IN MEDICAL RECORD: ICD-10-PCS | Mod: CPTII,,, | Performed by: SPECIALIST

## 2023-02-27 PROCEDURE — 73610 X-RAY EXAM OF ANKLE: CPT | Mod: TC,RT

## 2023-02-27 PROCEDURE — 99024 PR POST-OP FOLLOW-UP VISIT: ICD-10-PCS | Mod: ,,, | Performed by: SPECIALIST

## 2023-02-27 PROCEDURE — 99024 POSTOP FOLLOW-UP VISIT: CPT | Mod: ,,, | Performed by: SPECIALIST

## 2023-02-27 PROCEDURE — 1159F MED LIST DOCD IN RCRD: CPT | Mod: CPTII,,, | Performed by: SPECIALIST

## 2023-02-27 NOTE — PROGRESS NOTES
[facility] Orthopaedic Surgery H&P Note    In brief, Rebekah Bullard is a 33 y.o. female status post left ankle trimalleolar fracture ORIF on 01/10/2023    HPI:  She is now 7 weeks out from surgery.  She is been compliant with nonweightbearing.  She is been doing some passive range-of-motion exercises.  Her wounds have all healed.  He is eager to get back to walking.    PMH: No past medical history on file.    PSH:   Past Surgical History:   Procedure Laterality Date    APPENDECTOMY  2001    ORIF, FRACTURE, ANKLE, TRIMALLEOLAR Right 1/10/2023    Procedure: ORIF, FRACTURE, ANKLE, TRIMALLEOLAR;  Surgeon: Cali Lopez Jr., MD;  Location: [facility];  Service: Orthopedics;  Laterality: Right;  c arm   kim  bone foam  c armour  tourniquet       SH:   Social History     Socioeconomic History    Marital status: Single   Tobacco Use    Smoking status: Never    Smokeless tobacco: Never   Substance and Sexual Activity    Alcohol use: Yes     Comment: occassionally    Drug use: Not Currently     Types: Marijuana     Comment: occ    Sexual activity: Yes       FH:   Family History   Problem Relation Age of Onset    Hypertension Father        Allergies: Review of patient's allergies indicates:  No Known Allergies    ROS:  Constitutional- no fever, fatigue, weakness  Eye- no vision loss, eye redness, drainage, or pain  ENMT- no sore throat, ear pain, sinus pain/congestion, nasal congestion/drainage  Respiratory- no cough, wheezing, or shortness of breath  CV- no chest pain, no palpitations, no edema  GI- no N/V/D; no abdominal pain    Physical Exam:  There were no vitals filed for this visit.      General: NAD  Cardio: RRR by peripheral pulse  Pulm: Normal WOB on room air, symmetric chest rise  Abd: Soft, NT/ND    MSK:    Left lower extremity   Posterolateral and medial incisions healing well, no erythema drainage or fluctuance  Ankle dorsiflexion to 10 ° short of neutral  Sensation intact to light touch over the dorsum and plantar  aspect of her foot   Some diffuse numbness over her dorsal great toe   Palpable dorsalis pedis pulse   EHL/FHL 5/5    Imaging:   Independent review of radiographs today show appropriate placement of trimalleolar fixation, no interval displacement.    Assessment:  33-year-old female status post left trimalleolar ankle fracture ORIF on 01/10/2023     -patient is doing well now 7 weeks out from surgery  -okay to start weight-bearing as tolerated in her Cam boot next week  -we went over several at-home ankle range of motion and strengthening exercises   -I would like to see her back in 1 month for range-of-motion check, if she is doing well PRN.  If she is still stiff she can do some physical therapy at that point        Dr. Rasheed Turk  [facility] Orthopaedic Surgery

## 2023-03-27 ENCOUNTER — HOSPITAL ENCOUNTER (OUTPATIENT)
Dept: RADIOLOGY | Facility: HOSPITAL | Age: 33
Discharge: HOME | End: 2023-03-27
Attending: ORTHOPAEDIC SURGERY
Payer: MEDICAID

## 2023-03-27 ENCOUNTER — OFFICE VISIT (OUTPATIENT)
Dept: ORTHOPEDICS | Facility: CLINIC | Age: 33
End: 2023-03-27
Payer: MEDICAID

## 2023-03-27 VITALS
SYSTOLIC BLOOD PRESSURE: 132 MMHG | WEIGHT: 138 LBS | OXYGEN SATURATION: 100 % | HEIGHT: 63 IN | BODY MASS INDEX: 24.45 KG/M2 | HEART RATE: 78 BPM | RESPIRATION RATE: 18 BRPM | DIASTOLIC BLOOD PRESSURE: 85 MMHG

## 2023-03-27 DIAGNOSIS — S82.851A CLOSED DISPLACED TRIMALLEOLAR FRACTURE OF RIGHT ANKLE, INITIAL ENCOUNTER: Primary | ICD-10-CM

## 2023-03-27 DIAGNOSIS — S82.851A CLOSED DISPLACED TRIMALLEOLAR FRACTURE OF RIGHT ANKLE, INITIAL ENCOUNTER: ICD-10-CM

## 2023-03-27 PROCEDURE — 3079F PR MOST RECENT DIASTOLIC BLOOD PRESSURE 80-89 MM HG: ICD-10-PCS | Mod: CPTII,,, | Performed by: STUDENT IN AN ORGANIZED HEALTH CARE EDUCATION/TRAINING PROGRAM

## 2023-03-27 PROCEDURE — 3075F SYST BP GE 130 - 139MM HG: CPT | Mod: CPTII,,, | Performed by: STUDENT IN AN ORGANIZED HEALTH CARE EDUCATION/TRAINING PROGRAM

## 2023-03-27 PROCEDURE — 99024 PR POST-OP FOLLOW-UP VISIT: ICD-10-PCS | Mod: ,,, | Performed by: STUDENT IN AN ORGANIZED HEALTH CARE EDUCATION/TRAINING PROGRAM

## 2023-03-27 PROCEDURE — 3075F PR MOST RECENT SYSTOLIC BLOOD PRESS GE 130-139MM HG: ICD-10-PCS | Mod: CPTII,,, | Performed by: STUDENT IN AN ORGANIZED HEALTH CARE EDUCATION/TRAINING PROGRAM

## 2023-03-27 PROCEDURE — 3008F BODY MASS INDEX DOCD: CPT | Mod: CPTII,,, | Performed by: STUDENT IN AN ORGANIZED HEALTH CARE EDUCATION/TRAINING PROGRAM

## 2023-03-27 PROCEDURE — 99024 POSTOP FOLLOW-UP VISIT: CPT | Mod: ,,, | Performed by: STUDENT IN AN ORGANIZED HEALTH CARE EDUCATION/TRAINING PROGRAM

## 2023-03-27 PROCEDURE — 3079F DIAST BP 80-89 MM HG: CPT | Mod: CPTII,,, | Performed by: STUDENT IN AN ORGANIZED HEALTH CARE EDUCATION/TRAINING PROGRAM

## 2023-03-27 PROCEDURE — 3008F PR BODY MASS INDEX (BMI) DOCUMENTED: ICD-10-PCS | Mod: CPTII,,, | Performed by: STUDENT IN AN ORGANIZED HEALTH CARE EDUCATION/TRAINING PROGRAM

## 2023-03-27 PROCEDURE — 1159F MED LIST DOCD IN RCRD: CPT | Mod: CPTII,,, | Performed by: STUDENT IN AN ORGANIZED HEALTH CARE EDUCATION/TRAINING PROGRAM

## 2023-03-27 PROCEDURE — 1159F PR MEDICATION LIST DOCUMENTED IN MEDICAL RECORD: ICD-10-PCS | Mod: CPTII,,, | Performed by: STUDENT IN AN ORGANIZED HEALTH CARE EDUCATION/TRAINING PROGRAM

## 2023-03-27 PROCEDURE — 99213 OFFICE O/P EST LOW 20 MIN: CPT | Mod: PBBFAC

## 2023-03-27 PROCEDURE — 73610 X-RAY EXAM OF ANKLE: CPT | Mod: TC,RT

## 2023-03-27 NOTE — PROGRESS NOTES
[facility] Orthopaedic Surgery H&P Note    In brief, Rebekah Bullard is a 33 y.o. female status post left ankle trimalleolar fracture ORIF on 01/10/2023    HPI:  She is now 11 weeks out from surgery.  She no longer has pain.  She is back to walking in regular footwear.  She feels like she still walks with a limp and she is a little stiff.  She works as a  in his eager to get back to work.  No wound issues.      PMH: History reviewed. No pertinent past medical history.    PSH:   Past Surgical History:   Procedure Laterality Date    APPENDECTOMY  2001    ORIF, FRACTURE, ANKLE, TRIMALLEOLAR Right 1/10/2023    Procedure: ORIF, FRACTURE, ANKLE, TRIMALLEOLAR;  Surgeon: Cali Lopez Jr., MD;  Location: [facility];  Service: Orthopedics;  Laterality: Right;  c arm   kim  bone foam  c armour  tourniquet       SH:   Social History     Socioeconomic History    Marital status: Single   Tobacco Use    Smoking status: Never    Smokeless tobacco: Never   Substance and Sexual Activity    Alcohol use: Yes     Comment: occassionally    Drug use: Not Currently     Types: Marijuana     Comment: occ    Sexual activity: Yes       FH:   Family History   Problem Relation Age of Onset    Hypertension Father        Allergies: Review of patient's allergies indicates:  No Known Allergies    ROS:  Constitutional- no fever, fatigue, weakness  Eye- no vision loss, eye redness, drainage, or pain  ENMT- no sore throat, ear pain, sinus pain/congestion, nasal congestion/drainage  Respiratory- no cough, wheezing, or shortness of breath  CV- no chest pain, no palpitations, no edema  GI- no N/V/D; no abdominal pain    Physical Exam:  Vitals:    03/27/23 0803   BP: 132/85   Pulse: 78   Resp: 18         General: NAD  Cardio: RRR by peripheral pulse  Pulm: Normal WOB on room air, symmetric chest rise  Abd: Soft, NT/ND    MSK:    Left lower extremity   Posterolateral and medial incisions fully healed, no erythema drainage or fluctuance  Ankle  dorsiflexion to 10 ° short of neutral  Sensation intact to light touch over the dorsum and plantar aspect of her foot   Some diffuse numbness over her dorsal great toe   Palpable dorsalis pedis pulse   EHL/FHL 5/5    Imaging:   Independent review of radiographs today show appropriate placement of trimalleolar fixation, no interval displacement.    Assessment:  33-year-old female status post left trimalleolar ankle fracture ORIF on 01/10/2023     -okay to weightbear as tolerated and return to work   -she has a mild equinus contracture, we gave her a referral for physical therapy and taught her eccentric stretching exercises that she should do on a daily basis at home   -I would like to see her back in 1 month for final range-of-motion check, okay to PRN at that point        Dr. Rasheed Truk  [facility] Orthopaedic Surgery

## 2023-03-28 NOTE — PROGRESS NOTES
Faculty Attestation: Rebekah Bullard  was seen at [facility] in the Orthopaedic Clinic. Discussed with the resident at the time of the visit. History of Present Illness, Physical Exam, and Assessment and Plan reviewed. Treatment plan is reasonable and appropriate. Compliance with treatment recommendations is important. No procedure was performed.     Romeo Hernandez MD  Orthopaedic Surgery

## 2023-03-29 NOTE — PROGRESS NOTES
Faculty Attestation: Rebekah Bullard  was seen at [facility] in the Orthopaedic Clinic. Discussed with the resident at the time of the visit. History of Present Illness, Physical Exam, and Assessment and Plan reviewed. Treatment plan is reasonable and appropriate. Compliance with treatment recommendations is important. Discussed with the resident at the time of the visit.  No procedure was performed.     Edgar Chairez MD MD  Orthopaedic Surgery    other... other... other... other... other... other... other...

## 2023-05-03 ENCOUNTER — OFFICE VISIT (OUTPATIENT)
Dept: URGENT CARE | Facility: CLINIC | Age: 33
End: 2023-05-03
Payer: MEDICAID

## 2023-05-03 VITALS
RESPIRATION RATE: 16 BRPM | TEMPERATURE: 99 F | OXYGEN SATURATION: 96 % | HEIGHT: 63 IN | WEIGHT: 135 LBS | BODY MASS INDEX: 23.92 KG/M2 | HEART RATE: 87 BPM | SYSTOLIC BLOOD PRESSURE: 128 MMHG | DIASTOLIC BLOOD PRESSURE: 87 MMHG

## 2023-05-03 DIAGNOSIS — U07.1 COVID: Primary | ICD-10-CM

## 2023-05-03 DIAGNOSIS — R09.89 SYMPTOMS OF UPPER RESPIRATORY INFECTION (URI): ICD-10-CM

## 2023-05-03 LAB
CTP QC/QA: YES
SARS-COV-2 RDRP RESP QL NAA+PROBE: POSITIVE

## 2023-05-03 PROCEDURE — 99213 OFFICE O/P EST LOW 20 MIN: CPT | Mod: S$PBB,,,

## 2023-05-03 PROCEDURE — 99214 OFFICE O/P EST MOD 30 MIN: CPT | Mod: PBBFAC

## 2023-05-03 PROCEDURE — 99213 PR OFFICE/OUTPT VISIT, EST, LEVL III, 20-29 MIN: ICD-10-PCS | Mod: S$PBB,,,

## 2023-05-03 PROCEDURE — 87635 SARS-COV-2 COVID-19 AMP PRB: CPT | Mod: PBBFAC

## 2023-05-03 RX ORDER — FLUTICASONE PROPIONATE 50 MCG
2 SPRAY, SUSPENSION (ML) NASAL DAILY
Qty: 18.2 ML | Refills: 0 | Status: SHIPPED | OUTPATIENT
Start: 2023-05-03

## 2023-05-03 RX ORDER — LORATADINE 10 MG/1
10 TABLET ORAL DAILY
Qty: 30 TABLET | Refills: 0 | Status: SHIPPED | OUTPATIENT
Start: 2023-05-03 | End: 2023-06-02

## 2023-05-03 RX ORDER — PROMETHAZINE HYDROCHLORIDE AND DEXTROMETHORPHAN HYDROBROMIDE 6.25; 15 MG/5ML; MG/5ML
5 SYRUP ORAL EVERY 6 HOURS PRN
Qty: 118 ML | Refills: 0 | Status: SHIPPED | OUTPATIENT
Start: 2023-05-03 | End: 2023-05-13

## 2023-05-03 RX ORDER — GUAIFENESIN/DEXTROMETHORPHAN 100-10MG/5
5 SYRUP ORAL EVERY 6 HOURS PRN
Qty: 118 ML | Refills: 0 | Status: SHIPPED | OUTPATIENT
Start: 2023-05-03 | End: 2023-05-13

## 2023-05-03 NOTE — PROGRESS NOTES
"Subjective:      Patient ID: Rebekah Bullard is a 33 y.o. female.    Vitals:  height is 5' 3" (1.6 m) and weight is 61.2 kg (135 lb). Her oral temperature is 99.2 °F (37.3 °C). Her blood pressure is 128/87 and her pulse is 87. Her respiration is 16 and oxygen saturation is 96%.     Chief Complaint: sneezing, itchy throat, Fever, Cough, and Fatigue    PT states sneezing, itchy throat, fever, cough and fatigue since yesterday. Denies known sick contacts.    Fever   Associated symptoms include congestion and coughing.   Cough  Associated symptoms include a fever.   Fatigue  Associated symptoms include congestion, coughing, fatigue and a fever.     Constitution: Positive for fatigue and fever.   HENT:  Positive for congestion.    Neck: neck negative.   Cardiovascular: Negative.    Eyes: Negative.    Respiratory:  Positive for cough.    Gastrointestinal: Negative.    Genitourinary: Negative.    Musculoskeletal: Negative.    Skin: Negative.    Neurological: Negative.     Objective:     Physical Exam   Constitutional: She is oriented to person, place, and time. normal  HENT:   Head: Normocephalic.   Ears:   Right Ear: Tympanic membrane, external ear and ear canal normal.   Left Ear: Tympanic membrane, external ear and ear canal normal.   Nose: Congestion present.   Mouth/Throat: Uvula is midline, oropharynx is clear and moist and mucous membranes are normal. Mucous membranes are moist. Oropharynx is clear.   Eyes: Pupils are equal, round, and reactive to light.   Cardiovascular: Normal rate, regular rhythm, normal heart sounds and normal pulses.   Pulmonary/Chest: Effort normal and breath sounds normal.   Abdominal: Normal appearance. Soft.   Musculoskeletal: Normal range of motion.         General: Normal range of motion.   Neurological: She is alert and oriented to person, place, and time.   Skin: Skin is warm and dry.   Vitals reviewed.  Results for orders placed or performed in visit on 05/03/23   POCT COVID-19 Rapid " Screening   Result Value Ref Range    POC Rapid COVID Positive (A) Negative     Acceptable Yes        Assessment:     1. COVID    2. Symptoms of upper respiratory infection (URI)        Plan:       COVID  -     fluticasone propionate (FLONASE) 50 mcg/actuation nasal spray; 2 sprays (100 mcg total) by Each Nostril route once daily.  Dispense: 18.2 mL; Refill: 0  -     loratadine (CLARITIN) 10 mg tablet; Take 1 tablet (10 mg total) by mouth once daily.  Dispense: 30 tablet; Refill: 0  -     dextromethorphan-guaiFENesin  mg/5 ml (ROBITUSSIN-DM)  mg/5 mL liquid; Take 5 mLs by mouth every 6 (six) hours as needed (cough).  Dispense: 118 mL; Refill: 0  -     promethazine-dextromethorphan (PROMETHAZINE-DM) 6.25-15 mg/5 mL Syrp; Take 5 mLs by mouth every 6 (six) hours as needed (cough).  Dispense: 118 mL; Refill: 0    Symptoms of upper respiratory infection (URI)  -     POCT COVID-19 Rapid Screening      Please drink plenty of fluids.  Please get plenty of rest.    Take over the counter Tylenol (Acetaminophen) and/or Motrin (Ibuprofen) as directed for control of pain and/or fever.  Please follow up with your primary care doctor.     ER precautions given, patient verbalized understanding.     Please see provided patient education for guidance.    Follow up with PCP or return to clinic if symptoms worsen or do not improve.

## 2023-05-08 ENCOUNTER — OFFICE VISIT (OUTPATIENT)
Dept: ORTHOPEDICS | Facility: CLINIC | Age: 33
End: 2023-05-08
Payer: MEDICAID

## 2023-05-08 VITALS — WEIGHT: 138.63 LBS | HEIGHT: 63 IN | BODY MASS INDEX: 24.56 KG/M2

## 2023-05-08 DIAGNOSIS — S82.851A CLOSED DISPLACED TRIMALLEOLAR FRACTURE OF RIGHT ANKLE, INITIAL ENCOUNTER: Primary | ICD-10-CM

## 2023-05-08 PROCEDURE — 99213 PR OFFICE/OUTPT VISIT, EST, LEVL III, 20-29 MIN: ICD-10-PCS | Mod: S$PBB,,, | Performed by: ORTHOPAEDIC SURGERY

## 2023-05-08 PROCEDURE — 1160F RVW MEDS BY RX/DR IN RCRD: CPT | Mod: CPTII,,, | Performed by: ORTHOPAEDIC SURGERY

## 2023-05-08 PROCEDURE — 3008F BODY MASS INDEX DOCD: CPT | Mod: CPTII,,, | Performed by: ORTHOPAEDIC SURGERY

## 2023-05-08 PROCEDURE — 99213 OFFICE O/P EST LOW 20 MIN: CPT | Mod: S$PBB,,, | Performed by: ORTHOPAEDIC SURGERY

## 2023-05-08 PROCEDURE — 1159F PR MEDICATION LIST DOCUMENTED IN MEDICAL RECORD: ICD-10-PCS | Mod: CPTII,,, | Performed by: ORTHOPAEDIC SURGERY

## 2023-05-08 PROCEDURE — 3008F PR BODY MASS INDEX (BMI) DOCUMENTED: ICD-10-PCS | Mod: CPTII,,, | Performed by: ORTHOPAEDIC SURGERY

## 2023-05-08 PROCEDURE — 1160F PR REVIEW ALL MEDS BY PRESCRIBER/CLIN PHARMACIST DOCUMENTED: ICD-10-PCS | Mod: CPTII,,, | Performed by: ORTHOPAEDIC SURGERY

## 2023-05-08 PROCEDURE — 99213 OFFICE O/P EST LOW 20 MIN: CPT | Mod: PBBFAC

## 2023-05-08 PROCEDURE — 1159F MED LIST DOCD IN RCRD: CPT | Mod: CPTII,,, | Performed by: ORTHOPAEDIC SURGERY

## 2023-05-08 NOTE — PROGRESS NOTES
Faculty Attestation: Rebekah Bullard  was seen at [facility] in the Orthopaedic Clinic. Patient chart reviewed. History of Present Illness, Physical Exam, and Assessment and Plan reviewed. Treatment plan is reasonable and appropriate. Compliance with treatment recommendations is important. No procedure was performed.     Cr Thorne MD  Orthopaedic Surgery

## 2023-05-08 NOTE — PROGRESS NOTES
[facility] Orthopaedic Surgery H&P Note    In brief, Rebekah Bullard is a 33 y.o. female status post left ankle trimalleolar fracture ORIF on 01/10/2023    HPI:  Patient doing okay.  No new pain.  He has been working with therapy to get more ankle dorsiflexion.  PMH: History reviewed. No pertinent past medical history.    PSH:   Past Surgical History:   Procedure Laterality Date    APPENDECTOMY  2001    ORIF, FRACTURE, ANKLE, TRIMALLEOLAR Right 1/10/2023    Procedure: ORIF, FRACTURE, ANKLE, TRIMALLEOLAR;  Surgeon: Cali Lopez Jr., MD;  Location: [facility];  Service: Orthopedics;  Laterality: Right;  c arm   kim  bone foam  c armour  tourniquet       SH:   Social History     Socioeconomic History    Marital status: Single   Tobacco Use    Smoking status: Never    Smokeless tobacco: Never   Substance and Sexual Activity    Alcohol use: Yes     Comment: occassionally    Drug use: Not Currently     Types: Marijuana     Comment: occ    Sexual activity: Yes       FH:   Family History   Problem Relation Age of Onset    Hypertension Father        Allergies: Review of patient's allergies indicates:  No Known Allergies    ROS:  Constitutional- no fever, fatigue, weakness  Eye- no vision loss, eye redness, drainage, or pain  ENMT- no sore throat, ear pain, sinus pain/congestion, nasal congestion/drainage  Respiratory- no cough, wheezing, or shortness of breath  CV- no chest pain, no palpitations, no edema  GI- no N/V/D; no abdominal pain    Physical Exam:  There were no vitals filed for this visit.        General: NAD  Cardio: RRR by peripheral pulse  Pulm: Normal WOB on room air, symmetric chest rise  Abd: Soft, NT/ND    MSK:    Left lower extremity   Posterolateral and medial incisions fully healed, no erythema drainage or fluctuance  Ankle dorsiflexion to about 5° short of neutral with the knee extended and can get to neutral dorsiflexion with the knee flexed  Sensation intact to light touch over the dorsum and plantar  aspect of her foot   Some diffuse numbness over her dorsal great toe   Palpable dorsalis pedis pulse   EHL/FHL 5/5    Imaging:   Independent review of radiographs today show appropriate placement of trimalleolar fixation, no interval displacement.    Assessment:  33-year-old female status post left trimalleolar ankle fracture ORIF on 01/10/2023     -okay to weightbear as tolerated and return to work   Stretching at home and therapy for mild equinus contracture  --at this point she is not much pain and not having any issues with her decrease in dorsiflexion compared to contralateral we will let her follow-up as needed and she will continue to work on stretching      Troy Ellsworth

## 2023-11-02 ENCOUNTER — OFFICE VISIT (OUTPATIENT)
Dept: URGENT CARE | Facility: CLINIC | Age: 33
End: 2023-11-02
Payer: MEDICAID

## 2023-11-02 VITALS
DIASTOLIC BLOOD PRESSURE: 90 MMHG | TEMPERATURE: 99 F | OXYGEN SATURATION: 96 % | SYSTOLIC BLOOD PRESSURE: 139 MMHG | BODY MASS INDEX: 25.95 KG/M2 | HEIGHT: 62 IN | HEART RATE: 100 BPM | WEIGHT: 141 LBS | RESPIRATION RATE: 16 BRPM

## 2023-11-02 DIAGNOSIS — S46.811A TRAPEZIUS MUSCLE STRAIN, RIGHT, INITIAL ENCOUNTER: Primary | ICD-10-CM

## 2023-11-02 DIAGNOSIS — R51.9 ACUTE NONINTRACTABLE HEADACHE, UNSPECIFIED HEADACHE TYPE: ICD-10-CM

## 2023-11-02 LAB
CTP QC/QA: YES
SARS-COV-2 RDRP RESP QL NAA+PROBE: NEGATIVE

## 2023-11-02 PROCEDURE — 99215 OFFICE O/P EST HI 40 MIN: CPT | Mod: PBBFAC | Performed by: NURSE PRACTITIONER

## 2023-11-02 PROCEDURE — 87635 SARS-COV-2 COVID-19 AMP PRB: CPT | Mod: PBBFAC | Performed by: NURSE PRACTITIONER

## 2023-11-02 PROCEDURE — 63600175 PHARM REV CODE 636 W HCPCS: Performed by: NURSE PRACTITIONER

## 2023-11-02 PROCEDURE — 99213 OFFICE O/P EST LOW 20 MIN: CPT | Mod: S$PBB,,, | Performed by: NURSE PRACTITIONER

## 2023-11-02 PROCEDURE — 99213 PR OFFICE/OUTPT VISIT, EST, LEVL III, 20-29 MIN: ICD-10-PCS | Mod: S$PBB,,, | Performed by: NURSE PRACTITIONER

## 2023-11-02 RX ORDER — METHOCARBAMOL 500 MG/1
500 TABLET, FILM COATED ORAL 3 TIMES DAILY
Qty: 21 TABLET | Refills: 0 | Status: SHIPPED | OUTPATIENT
Start: 2023-11-02 | End: 2023-11-09

## 2023-11-02 RX ORDER — KETOROLAC TROMETHAMINE 30 MG/ML
60 INJECTION, SOLUTION INTRAMUSCULAR; INTRAVENOUS
Status: COMPLETED | OUTPATIENT
Start: 2023-11-02 | End: 2023-11-02

## 2023-11-02 RX ADMIN — KETOROLAC TROMETHAMINE 60 MG: 30 INJECTION, SOLUTION INTRAMUSCULAR at 04:11

## 2023-11-02 NOTE — PROGRESS NOTES
"Subjective:      Patient ID: Rebekah Bullard is a 33 y.o. female.    Vitals:  height is 5' 2" (1.575 m) and weight is 64 kg (141 lb). Her oral temperature is 98.8 °F (37.1 °C). Her blood pressure is 139/90 (abnormal) and her pulse is 100. Her respiration is 16 and oxygen saturation is 96%.     Chief Complaint: Headache (Patient reports having a headache x 3 days. Reports headache is on the right side, started at the base of head 3 days ago, now the entire right side of head with no light sensitivity.)    Headache      As stated in CC. Pt c/o unilateral headache x3 days, headache began in lower neck and worse with movement of head. Pt has taken motrin with some relief of pain. Pt reports working as a  and does carry large object and lifts over her head. Takes Claritin daily, denies n/v/, sensitivity to light, runny nose, cough, fever, visual disturbances, dizziness.  LMP 10/28/2023. Last eye exam unknown    Neurological:  Positive for headaches.      Objective:     Physical Exam   Constitutional: She is oriented to person, place, and time. She appears well-developed.  Non-toxic appearance. She does not appear ill. No distress.   HENT:   Head: Normocephalic and atraumatic.   Ears:   Right Ear: Tympanic membrane normal. No no drainage, swelling or tenderness. Tympanic membrane is not erythematous, not retracted and not bulging. No middle ear effusion.   Left Ear: Tympanic membrane normal. No no drainage, swelling or tenderness. Tympanic membrane is not erythematous, not retracted and not bulging.  No middle ear effusion.   Nose: Nose normal. No rhinorrhea, purulent discharge or congestion. Right sinus exhibits no maxillary sinus tenderness and no frontal sinus tenderness. Left sinus exhibits no maxillary sinus tenderness and no frontal sinus tenderness.   Mouth/Throat: Uvula is midline and mucous membranes are normal. Mucous membranes are moist. No oropharyngeal exudate, posterior oropharyngeal edema, " posterior oropharyngeal erythema or tonsillar abscesses.   Eyes: Conjunctivae are normal. Pupils are equal, round, and reactive to light. Right eye exhibits no discharge. Left eye exhibits no discharge. Extraocular movement intact   Neck: Neck supple.      Comments: NO JVD, right side TTP of right trapezius angle. No neck rigidity present.   Cardiovascular: Regular rhythm.   Pulmonary/Chest: Effort normal and breath sounds normal. No respiratory distress. She has no wheezes. She has no rales.   Abdominal: Normal appearance. She exhibits no distension. Soft.   Musculoskeletal:      Cervical back: She exhibits tenderness.   Lymphadenopathy:     She has no cervical adenopathy.   Neurological: no focal deficit. She is alert and oriented to person, place, and time. She displays no weakness. No cranial nerve deficit or sensory deficit. Gait normal. Coordination and gait normal. GCS eye subscore is 4. GCS verbal subscore is 5. GCS motor subscore is 6.   Skin: Skin is warm, dry and not diaphoretic. Capillary refill takes less than 2 seconds.   Psychiatric: Her behavior is normal. Mood, judgment and thought content normal.   Nursing note and vitals reviewed.      Assessment:     1. Trapezius muscle strain, right, initial encounter    2. Acute nonintractable headache, unspecified headache type      Results for orders placed or performed in visit on 11/02/23   POCT COVID-19 Rapid Screening   Result Value Ref Range    POC Rapid COVID Negative Negative     Acceptable Yes          Plan:   Tyelnol and Motrin for pain  F/u with PCP  Use heat to help with discomfort  Discussed possible eye strain. Encouraged eye exam to r/o eye strain  Pain appears musculoskeletal related, pain is in head and neck with movement. Neuro exam benign.  Trapezius muscle strain, right, initial encounter  -     methocarbamoL (ROBAXIN) 500 MG Tab; Take 1 tablet (500 mg total) by mouth 3 (three) times daily. for 7 days  Dispense: 21 tablet;  Refill: 0    Acute nonintractable headache, unspecified headache type  -     POCT COVID-19 Rapid Screening    Other orders  -     ketorolac injection 60 mg

## 2023-11-02 NOTE — PATIENT INSTRUCTIONS
Please follow instructions on patient education material.  Return to urgent care in 2 to 3 days if symptoms are not improving, immediately if you develop any new or worsening symptoms  Medications may not resolve your issue.  Please see the included patient education for guidance on exercises and stretches.    Tyelnol and Motrin for pain  F/u with PCP  Please read the attached information about NSAIDs.    Please use the prescriptions that were sent for you.     You got a Toradol shot in clinic today. For the next 8 hours do not take:  Ibuprofen  Naproxen  Advil  Aleve  Motrin  Ketorolac   Diclofenac  Meloxicam   If home routine and medications do not help your pain, please consider seeing a chiropractor, massage therapist or physical therapist even 1 time for manual manipulation.     **Drug [place]: Dr Hester's [place] for soreness, muscle recovery, post-workout. Large bags are $5, pour in tub of warm water and soak for 10-20 minutes.    **Light range of motion of the affected muscles.    **Roll muscle spasm on Lacrosse ball or tennis ball    **Heating pad

## 2023-12-26 ENCOUNTER — OFFICE VISIT (OUTPATIENT)
Dept: URGENT CARE | Facility: CLINIC | Age: 33
End: 2023-12-26
Payer: MEDICAID

## 2023-12-26 VITALS
DIASTOLIC BLOOD PRESSURE: 89 MMHG | TEMPERATURE: 98 F | WEIGHT: 140.5 LBS | HEART RATE: 86 BPM | SYSTOLIC BLOOD PRESSURE: 137 MMHG | HEIGHT: 62 IN | RESPIRATION RATE: 16 BRPM | BODY MASS INDEX: 25.86 KG/M2 | OXYGEN SATURATION: 99 %

## 2023-12-26 DIAGNOSIS — U07.1 COVID-19: Primary | ICD-10-CM

## 2023-12-26 DIAGNOSIS — R68.89 FLU-LIKE SYMPTOMS: ICD-10-CM

## 2023-12-26 LAB
CTP QC/QA: YES
MOLECULAR STREP A: NEGATIVE
POC MOLECULAR INFLUENZA A AGN: NEGATIVE
POC MOLECULAR INFLUENZA B AGN: NEGATIVE
SARS-COV-2 RDRP RESP QL NAA+PROBE: POSITIVE

## 2023-12-26 PROCEDURE — 87502 INFLUENZA DNA AMP PROBE: CPT | Mod: PBBFAC | Performed by: STUDENT IN AN ORGANIZED HEALTH CARE EDUCATION/TRAINING PROGRAM

## 2023-12-26 PROCEDURE — 99213 PR OFFICE/OUTPT VISIT, EST, LEVL III, 20-29 MIN: ICD-10-PCS | Mod: S$PBB,,, | Performed by: STUDENT IN AN ORGANIZED HEALTH CARE EDUCATION/TRAINING PROGRAM

## 2023-12-26 PROCEDURE — 99213 OFFICE O/P EST LOW 20 MIN: CPT | Mod: PBBFAC | Performed by: STUDENT IN AN ORGANIZED HEALTH CARE EDUCATION/TRAINING PROGRAM

## 2023-12-26 PROCEDURE — 87635 SARS-COV-2 COVID-19 AMP PRB: CPT | Mod: PBBFAC | Performed by: STUDENT IN AN ORGANIZED HEALTH CARE EDUCATION/TRAINING PROGRAM

## 2023-12-26 PROCEDURE — 87651 STREP A DNA AMP PROBE: CPT | Mod: PBBFAC | Performed by: STUDENT IN AN ORGANIZED HEALTH CARE EDUCATION/TRAINING PROGRAM

## 2023-12-26 PROCEDURE — 99213 OFFICE O/P EST LOW 20 MIN: CPT | Mod: S$PBB,,, | Performed by: STUDENT IN AN ORGANIZED HEALTH CARE EDUCATION/TRAINING PROGRAM

## 2023-12-26 NOTE — PATIENT INSTRUCTIONS
You tested POSITIVE for Covid-19    You will need to stay home for at least 5 days and isolate from others in your home.You are likely most infectious during these first 5 days (Day 0 of isolation is the day of symptom onset, regardless of when you tested positive)  You may end isolation after day 5 if you are fever-free for 24 hours (without the use of fever-reducing medication), but you will still need to wear your mask through day 10.    General Recommendations:  - Wear a high-quality mask if you must be around others at home and in public.  - Do not go places where you are unable to wear a mask. For travel guidance, see CDCs Travel webpage.  - Do not travel.  - Stay home and separate from others as much as possible.  - Use a separate bathroom, if possible.  - Take steps to improve ventilation at home, if possible.  - Dont share personal household items, like cups, towels, and utensils.  - Monitor your symptoms. If you have an emergency warning sign (like trouble breathing), seek emergency medical care immediately.

## 2023-12-26 NOTE — LETTER
December 26, 2023      [facility]  [facility] [facility] [address]  Phone: [phone]       Patient: Rebekah Bullard   YOB: 1990  Date of Visit: 12/26/2023    To Whom It May Concern:    Mohsen Bullard  was at [facility] on 12/26/2023. The patient may return to work/school on 12/30/2023 with no restrictions. If you have any questions or concerns, or if I can be of further assistance, please do not hesitate to contact me.    Sincerely,      Carmina Elkins MD

## 2023-12-26 NOTE — PROGRESS NOTES
"Subjective:      Patient ID: Rebekah Bullard is a 33 y.o. female.    Vitals:  height is 5' 2.01" (1.575 m) and weight is 63.7 kg (140 lb 8 oz). Her temperature is 98.2 °F (36.8 °C). Her blood pressure is 137/89 and her pulse is 86. Her respiration is 16 and oxygen saturation is 99%.     Chief Complaint: URI ([place], sore throat, nasal congestion, fatigue)    HPI  Rebekah Bullard is a 33-year-old female presenting to the urgent care clinic today with URI symptoms that has been going on since Sunday (2 days ago) patient currently complains of headache, sore throat, chest congestion, intermittent coughing, generalized fatigue.  Patient denies any chest pain or any shortness of breath.  Denies any GI or  symptoms.  ROS   Objective:     Physical Exam   Constitutional: She is oriented to person, place, and time. She appears well-developed. She is cooperative.  Non-toxic appearance. She does not appear ill. No distress.   HENT:   Head: Normocephalic and atraumatic.   Ears:   Right Ear: Hearing, tympanic membrane, external ear and ear canal normal.   Left Ear: Hearing, tympanic membrane, external ear and ear canal normal.   Nose: Nose normal. No mucosal edema, rhinorrhea or nasal deformity. No epistaxis. Right sinus exhibits no maxillary sinus tenderness and no frontal sinus tenderness. Left sinus exhibits no maxillary sinus tenderness and no frontal sinus tenderness.   Mouth/Throat: Uvula is midline, oropharynx is clear and moist and mucous membranes are normal. No trismus in the jaw. Normal dentition. No uvula swelling. No oropharyngeal exudate, posterior oropharyngeal edema or posterior oropharyngeal erythema.   Eyes: Conjunctivae and lids are normal. No scleral icterus.   Neck: Trachea normal and phonation normal. Neck supple. No edema present. No erythema present. No neck rigidity present.   Cardiovascular: Normal rate, regular rhythm, normal heart sounds and normal pulses.   Pulmonary/Chest: Effort normal and " breath sounds normal. No respiratory distress. She has no decreased breath sounds. She has no rhonchi.   Abdominal: Normal appearance.   Musculoskeletal: Normal range of motion.         General: No deformity. Normal range of motion.   Neurological: She is alert and oriented to person, place, and time. She exhibits normal muscle tone. Coordination normal.   Skin: Skin is warm, dry, intact, not diaphoretic and not pale.   Psychiatric: Her speech is normal and behavior is normal. Judgment and thought content normal.   Nursing note and vitals reviewed.      Assessment:     1. COVID-19    2. Flu-like symptoms        Plan:     Patient presents to the urgent care clinic today with URI symptoms for the past 2 days.  Rapid testing in the office shows that she has COVID-19.  We will send in a prescription for symptomatic relief of her symptoms.  Work excuse provided the patient.  Return precautions discussed with patient.    COVID-19    Flu-like symptoms  -     POCT COVID-19 Rapid Screening  -     POCT Influenza A/B MOLECULAR  -     POCT Strep A, Molecular    Other orders  -     phenylephrine-DM-acetaminophen 5- mg Tab; Take 2 each by mouth 3 (three) times daily as needed (cough,congestion,body aches).  Dispense: 30 each; Refill: 0    This note is dictated using the M*Modal Fluency Direct word recognition program. There are word recognition mistakes that are occasionally missed on review.    Carmina Elkins MD

## 2024-12-05 ENCOUNTER — OFFICE VISIT (OUTPATIENT)
Dept: URGENT CARE | Facility: CLINIC | Age: 34
End: 2024-12-05
Payer: MEDICAID

## 2024-12-05 VITALS
RESPIRATION RATE: 16 BRPM | HEART RATE: 87 BPM | TEMPERATURE: 98 F | BODY MASS INDEX: 24.36 KG/M2 | SYSTOLIC BLOOD PRESSURE: 136 MMHG | WEIGHT: 137.5 LBS | HEIGHT: 63 IN | OXYGEN SATURATION: 100 % | DIASTOLIC BLOOD PRESSURE: 86 MMHG

## 2024-12-05 DIAGNOSIS — N39.0 ACUTE UTI: Primary | ICD-10-CM

## 2024-12-05 DIAGNOSIS — R31.9 HEMATURIA, UNSPECIFIED TYPE: ICD-10-CM

## 2024-12-05 LAB
B-HCG UR QL: NEGATIVE
BILIRUB UR QL STRIP: NEGATIVE
CTP QC/QA: YES
GLUCOSE UR QL STRIP: NEGATIVE
KETONES UR QL STRIP: NEGATIVE
LEUKOCYTE ESTERASE UR QL STRIP: POSITIVE
PH, POC UA: 7
POC BLOOD, URINE: POSITIVE
POC NITRATES, URINE: NEGATIVE
PROT UR QL STRIP: NEGATIVE
SP GR UR STRIP: 1.01 (ref 1–1.03)
UROBILINOGEN UR STRIP-ACNC: ABNORMAL (ref 0.1–1.1)

## 2024-12-05 PROCEDURE — 99213 OFFICE O/P EST LOW 20 MIN: CPT | Mod: S$PBB,,, | Performed by: NURSE PRACTITIONER

## 2024-12-05 PROCEDURE — 99214 OFFICE O/P EST MOD 30 MIN: CPT | Mod: PBBFAC | Performed by: NURSE PRACTITIONER

## 2024-12-05 PROCEDURE — 87086 URINE CULTURE/COLONY COUNT: CPT | Performed by: NURSE PRACTITIONER

## 2024-12-05 PROCEDURE — 81003 URINALYSIS AUTO W/O SCOPE: CPT | Mod: PBBFAC | Performed by: NURSE PRACTITIONER

## 2024-12-05 PROCEDURE — 81025 URINE PREGNANCY TEST: CPT | Mod: PBBFAC | Performed by: NURSE PRACTITIONER

## 2024-12-05 RX ORDER — AZELASTINE 1 MG/ML
SPRAY, METERED NASAL
COMMUNITY

## 2024-12-05 RX ORDER — NITROFURANTOIN 25; 75 MG/1; MG/1
100 CAPSULE ORAL 2 TIMES DAILY
Qty: 14 CAPSULE | Refills: 0 | Status: SHIPPED | OUTPATIENT
Start: 2024-12-05 | End: 2024-12-12

## 2024-12-05 RX ORDER — ATORVASTATIN CALCIUM 20 MG/1
1 TABLET, FILM COATED ORAL NIGHTLY
COMMUNITY

## 2024-12-05 NOTE — PATIENT INSTRUCTIONS
Medication as prescribed.  Urine sent for C&S.  Maintain adequate hydration.  If any flank pain, fever or any symptoms immediately go to the ER.

## 2024-12-05 NOTE — PROGRESS NOTES
"Subjective:       Patient ID: Rebekah Bullard is a 34 y.o. female.    Vitals:  height is 5' 3" (1.6 m) and weight is 62.4 kg (137 lb 8 oz). Her temperature is 98.1 °F (36.7 °C). Her blood pressure is 136/86 and her pulse is 87. Her respiration is 16 and oxygen saturation is 100%.     Chief Complaint: Hematuria (Hematuria since last night. States h/o UTI)    HPI    Constitution: Negative.   Cardiovascular: Negative.    Gastrointestinal: Negative.    Neurological: Negative.        Objective:      Physical Exam   Constitutional: She is oriented to person, place, and time. She appears well-developed.   HENT:   Head: Normocephalic.   Eyes: EOM are normal.   Abdominal: Soft. There is no abdominal tenderness. There is no rebound, no guarding, no left CVA tenderness and no right CVA tenderness.   Musculoskeletal: Normal range of motion.         General: Normal range of motion.   Neurological: She is alert and oriented to person, place, and time.   Skin: Skin is warm and dry. Capillary refill takes less than 2 seconds.   Psychiatric: Her behavior is normal.   Vitals reviewed.        Assessment:       1. Acute UTI    2. Hematuria, unspecified type            Office Visit on 12/05/2024   Component Date Value Ref Range Status    POC Blood, Urine 12/05/2024 Positive (A)  Negative Final    POC Bilirubin, Urine 12/05/2024 Negative  Negative Final    POC Urobilinogen, Urine 12/05/2024 0.2e.u./dl  0.1 - 1.1 Final    POC Ketones, Urine 12/05/2024 Negative  Negative Final    POC Protein, Urine 12/05/2024 Negative  Negative Final    POC Nitrates, Urine 12/05/2024 Negative  Negative Final    POC Glucose, Urine 12/05/2024 Negative  Negative Final    pH, UA 12/05/2024 7.0   Final    POC Specific Gravity, Urine 12/05/2024 1.010  1.003 - 1.029 Final    POC Leukocytes, Urine 12/05/2024 Positive (A)  Negative Final    POC Preg Test, Ur 12/05/2024 Negative  Negative Final     Acceptable 12/05/2024 Yes   Final        No results " found.   Plan:         Acute UTI  -     Urine culture  -     nitrofurantoin, macrocrystal-monohydrate, (MACROBID) 100 MG capsule; Take 1 capsule (100 mg total) by mouth 2 (two) times daily. for 7 days  Dispense: 14 capsule; Refill: 0    Hematuria, unspecified type  -     POCT Urinalysis, Dipstick, Automated, W/O Scope  -     POCT urine pregnancy  -     Urine culture

## 2024-12-07 LAB — BACTERIA UR CULT: NORMAL

## 2024-12-10 ENCOUNTER — OFFICE VISIT (OUTPATIENT)
Dept: URGENT CARE | Facility: CLINIC | Age: 34
End: 2024-12-10
Payer: MEDICAID

## 2024-12-10 VITALS
HEART RATE: 94 BPM | WEIGHT: 137 LBS | SYSTOLIC BLOOD PRESSURE: 148 MMHG | OXYGEN SATURATION: 100 % | HEIGHT: 63 IN | RESPIRATION RATE: 20 BRPM | DIASTOLIC BLOOD PRESSURE: 94 MMHG | BODY MASS INDEX: 24.27 KG/M2 | TEMPERATURE: 98 F

## 2024-12-10 DIAGNOSIS — N75.0 BARTHOLIN'S GLAND CYST: ICD-10-CM

## 2024-12-10 DIAGNOSIS — R82.1 MYOGLOBINURIA: Primary | ICD-10-CM

## 2024-12-10 DIAGNOSIS — R31.9 HEMATURIA, UNSPECIFIED TYPE: ICD-10-CM

## 2024-12-10 LAB
BACTERIA #/AREA URNS AUTO: ABNORMAL /HPF
BILIRUB UR QL STRIP.AUTO: NEGATIVE
CLARITY UR: ABNORMAL
COLOR UR AUTO: ABNORMAL
GLUCOSE UR QL STRIP: NORMAL
HGB UR QL STRIP: ABNORMAL
HYALINE CASTS #/AREA URNS LPF: ABNORMAL /LPF
KETONES UR QL STRIP: NEGATIVE
LEUKOCYTE ESTERASE UR QL STRIP: 500
NITRITE UR QL STRIP: NEGATIVE
PH UR STRIP: 6.5 [PH]
PROT UR QL STRIP: ABNORMAL
RBC #/AREA URNS AUTO: ABNORMAL /HPF
SP GR UR STRIP.AUTO: <1.005 (ref 1–1.03)
SQUAMOUS #/AREA URNS LPF: ABNORMAL /HPF
UROBILINOGEN UR STRIP-ACNC: NORMAL
WBC #/AREA URNS AUTO: ABNORMAL /HPF

## 2024-12-10 PROCEDURE — 87661 TRICHOMONAS VAGINALIS AMPLIF: CPT | Performed by: FAMILY MEDICINE

## 2024-12-10 PROCEDURE — 87086 URINE CULTURE/COLONY COUNT: CPT | Performed by: FAMILY MEDICINE

## 2024-12-10 PROCEDURE — 81001 URINALYSIS AUTO W/SCOPE: CPT | Performed by: FAMILY MEDICINE

## 2024-12-10 PROCEDURE — 99215 OFFICE O/P EST HI 40 MIN: CPT | Mod: PBBFAC | Performed by: FAMILY MEDICINE

## 2024-12-10 PROCEDURE — 99214 OFFICE O/P EST MOD 30 MIN: CPT | Mod: S$PBB,,, | Performed by: FAMILY MEDICINE

## 2024-12-10 NOTE — PROGRESS NOTES
"Subjective:      Patient ID: Rebekah Bullard is a 34 y.o. female.    Vitals:  height is 5' 3" (1.6 m) and weight is 62.1 kg (137 lb). Her oral temperature is 98.3 °F (36.8 °C). Her blood pressure is 148/94 (abnormal) and her pulse is 94. Her respiration is 20 and oxygen saturation is 100%.     Chief Complaint: Hematuria (Patient reports having blood in her urine and tenderness to left side of vaginal area. Patient currently taking Macrobid for a UTI. )    HPI  34-year-old female presenting to urgent care with c/o hematuria for the past 1 week and tenderness to L labia over the past few days. She was seen at this clinic on 12/5 diagnosed with UTI, on day 4 of Macrobid. Returns today due to continued hematuria. No gross hematuria. Patient takes multiple medications including a statin but has been on this medication for a few years, no complaints of muscle cramping or flank pain. Denies any other urinary symptoms or fever. She also had L labial swelling that was mildly painful a few days ago. Swelling has nearly resolved and is no longer painful today. Slight increase in vaginal discharge, thin white, no foul odor.     ROS   See above     Objective:     Physical Exam   Constitutional: She does not appear ill. No distress.   HENT:   Mouth/Throat: Mucous membranes are moist. Oropharynx is clear.   Cardiovascular: Normal rate and regular rhythm.   No murmur heard.  Pulmonary/Chest: Effort normal and breath sounds normal.   Abdominal: Bowel sounds are normal. She exhibits no distension. Soft. flat abdomen There is no abdominal tenderness. There is no left CVA tenderness and no right CVA tenderness.      Comments: No CVA tenderness bilaterally, no suprapubic tenderness.   Genitourinary:         Comments: Bimanual deferred per patient request  Small Bartholin's gland cyst @ 0400 position on L labia, no erythema or signs of infection. Vaginal mucosa without erythema or lesions, thin white physiologic appearing discharge in " vaginal vault, cervical os closed without lesions or erythema, non apparent cervical discharge.      Neurological: She is alert.   Skin: Skin is no rash.   Vitals reviewed.      Assessment:     1. Myoglobinuria    2. Bartholin's gland cyst    3. Hematuria, unspecified type        Plan:       Myoglobinuria    Bartholin's gland cyst    Hematuria, unspecified type  -     Urinalysis, Reflex to Urine Culture  -     Sexually-Transmitted Infections (STIs) Increased Risk Panel  -     Urine culture    - Continue Macrobid to completion  - UA shows 3+ blood with 0-5 RBCs indicating myoglobinuria. Advised patient to discontinue statin via telephone call. F/u with PCP as she will need further evaluation and lab work for more complete w/u.   - Bartholin's cyst resolving, no need for immediate intervention  - STI panel pending

## 2024-12-11 ENCOUNTER — TELEPHONE (OUTPATIENT)
Dept: URGENT CARE | Facility: CLINIC | Age: 34
End: 2024-12-11
Payer: MEDICAID

## 2024-12-11 ENCOUNTER — OFFICE VISIT (OUTPATIENT)
Dept: URGENT CARE | Facility: CLINIC | Age: 34
End: 2024-12-11
Payer: MEDICAID

## 2024-12-11 DIAGNOSIS — A54.9 GONORRHEA: Primary | ICD-10-CM

## 2024-12-11 LAB
C TRACH RRNA UR QL NAA+PROBE: NOT DETECTED
N GONORRHOEA DNA UR QL NAA+PROBE: DETECTED
T VAGINALIS RRNA UR QL NAA+PROBE: NOT DETECTED

## 2024-12-11 PROCEDURE — 63600175 PHARM REV CODE 636 W HCPCS

## 2024-12-11 PROCEDURE — 99499 UNLISTED E&M SERVICE: CPT | Mod: S$PBB,,,

## 2024-12-11 PROCEDURE — 99213 OFFICE O/P EST LOW 20 MIN: CPT | Mod: PBBFAC

## 2024-12-11 RX ORDER — CEFTRIAXONE 500 MG/1
500 INJECTION, POWDER, FOR SOLUTION INTRAMUSCULAR; INTRAVENOUS
Status: COMPLETED | OUTPATIENT
Start: 2024-12-11 | End: 2024-12-11

## 2024-12-11 RX ORDER — LIDOCAINE HYDROCHLORIDE 10 MG/ML
1.8 INJECTION, SOLUTION EPIDURAL; INFILTRATION; INTRACAUDAL; PERINEURAL
Status: COMPLETED | OUTPATIENT
Start: 2024-12-11 | End: 2024-12-11

## 2024-12-11 RX ADMIN — CEFTRIAXONE SODIUM 500 MG: 500 INJECTION, POWDER, FOR SOLUTION INTRAMUSCULAR; INTRAVENOUS at 03:12

## 2024-12-11 RX ADMIN — LIDOCAINE HYDROCHLORIDE 18 MG: 10 INJECTION, SOLUTION EPIDURAL; INFILTRATION; INTRACAUDAL; PERINEURAL at 03:12

## 2024-12-11 NOTE — PROGRESS NOTES
Return visit for gonorrhea treatment, encouraged to notify partner(s) within last 3 months to seek screening, avoid all sexual activities at least 1 week post treatment.

## 2024-12-11 NOTE — TELEPHONE ENCOUNTER
Two phone verifiers confirmed, discussed with Rebekah she has tested positive for gonorrhea, encouraged to return to clinic for treatment, she verbalizes understanding.

## 2024-12-11 NOTE — PROGRESS NOTES
I have reviewed the notes, assessments, and/or procedures performed this visit.  Discussed case.  Hemoglobinuria is another possibility.  Patient needs labs.  Resident will contact.

## 2024-12-12 LAB — BACTERIA UR CULT: NORMAL

## (undated) DEVICE — HANDLE DEVON RIGID OR LIGHT

## (undated) DEVICE — BUCKET PLASTER DISPOSABLE

## (undated) DEVICE — KIT SURGICAL TURNOVER

## (undated) DEVICE — COVER TABLE HVY DTY 60X90IN

## (undated) DEVICE — TIP SUCTION YANKAUER

## (undated) DEVICE — DRAPE C-ARM COVER EZ 36X28IN

## (undated) DEVICE — SUT CTD VICRYL 2.0

## (undated) DEVICE — SCREW BONE LOCKING 3.5X20MM
Type: IMPLANTABLE DEVICE | Site: ANKLE | Status: NON-FUNCTIONAL
Removed: 2023-01-10

## (undated) DEVICE — GAUZE SPONGE 4X4 12PLY

## (undated) DEVICE — SUT MONO PLUS 2-0 CP-1 27IN

## (undated) DEVICE — GLOVE PROTEXIS HYDROGEL SZ7.5

## (undated) DEVICE — DRESSING GAUZE XEROFORM 5X9

## (undated) DEVICE — GLOVE PROTEXIS BLUE LATEX 7.5

## (undated) DEVICE — CLOSURE SKIN STERI STRIP 1/2X4

## (undated) DEVICE — BIT DRILL SCALED 2X105MM

## (undated) DEVICE — TUBING MEDI-VAC 20FT .25IN

## (undated) DEVICE — GOWN SMARTSLEEVE AAMI LVL4 LG

## (undated) DEVICE — DRAPE FULL SHEET 70X100IN

## (undated) DEVICE — 2.0 DRILL

## (undated) DEVICE — GLOVE PROTEXIS BLUE LATEX 7

## (undated) DEVICE — GLOVE PROTEXIS HYDROGEL SZ6

## (undated) DEVICE — KIT BASIC ORTHO UNIVERSITY

## (undated) DEVICE — GOWN POLY REINF X-LONG 2XL

## (undated) DEVICE — BANDAGE VELCLOSE ELAS 6INX5YD

## (undated) DEVICE — SOL 9P NACL IRR PIC IL

## (undated) DEVICE — TRAY CATH FOL SIL URIMTR 16FR

## (undated) DEVICE — DRAPE INCISE IOBAN 2 23X17IN

## (undated) DEVICE — 2.6 DRILL

## (undated) DEVICE — GLOVE PROTEXIS BLUE LATEX 6.5

## (undated) DEVICE — 2.4 DRILL

## (undated) DEVICE — SCREW VARIAX NON-LOK 2.7X26MM
Type: IMPLANTABLE DEVICE | Site: ANKLE | Status: NON-FUNCTIONAL
Removed: 2023-01-10

## (undated) DEVICE — SUT 1 36IN COATED VICRYL UN

## (undated) DEVICE — GLOVE PROTEXIS BLUE LATEX 8.5

## (undated) DEVICE — TOURNIQUET SB QC DP 24X4IN

## (undated) DEVICE — GLOVE PROTEXIS BLUE LATEX 8

## (undated) DEVICE — SUT 3-0 MONOCRYL PLUS PS-2

## (undated) DEVICE — DRAPE C-ARMOR EQUIPMENT COVER

## (undated) DEVICE — SCREW BONE LOCK T10 3.5X18MM
Type: IMPLANTABLE DEVICE | Site: ANKLE | Status: NON-FUNCTIONAL
Removed: 2023-01-10

## (undated) DEVICE — DRAPE EXTREMITY STD 89X128IN

## (undated) DEVICE — SPONGE LAP STRL 18X18IN

## (undated) DEVICE — SUT ETHILON 3-0 FS-1 30

## (undated) DEVICE — GOWN POLY REINF BRTH SLV XL

## (undated) DEVICE — GLOVE PROTEXIS LTX MICRO 8

## (undated) DEVICE — PAD CAST SPECIALIST STRL 6

## (undated) DEVICE — GLOVE PROTEXIS HYDROGEL SZ8

## (undated) DEVICE — BANDAGE ESMARK 6X12

## (undated) DEVICE — PENCIL ELECSURG ROCKER 15FT

## (undated) DEVICE — MANIFOLD 4 PORT

## (undated) DEVICE — APPLICATOR CHLORAPREP ORN 26ML

## (undated) DEVICE — PAD CAST SPECIALIST STRL 4

## (undated) DEVICE — K-WIRE TROCAR POINT 1.6X150MM
Type: IMPLANTABLE DEVICE | Site: ANKLE | Status: NON-FUNCTIONAL
Removed: 2023-01-10